# Patient Record
Sex: FEMALE | Race: WHITE | NOT HISPANIC OR LATINO | Employment: FULL TIME | URBAN - METROPOLITAN AREA
[De-identification: names, ages, dates, MRNs, and addresses within clinical notes are randomized per-mention and may not be internally consistent; named-entity substitution may affect disease eponyms.]

---

## 2023-06-17 LAB
CLUE CELLS: ABNORMAL
NUGENT SCORE: 4
VAGINITIS-BV + YEAST INTERPRETATION: ABNORMAL
YEAST: ABNORMAL

## 2023-09-12 PROBLEM — I10 BENIGN ESSENTIAL HYPERTENSION: Status: ACTIVE | Noted: 2023-09-12

## 2023-09-12 PROBLEM — N80.9 ENDOMETRIOSIS: Status: ACTIVE | Noted: 2023-09-12

## 2023-09-12 PROBLEM — S37.009A KIDNEY INJURY: Status: ACTIVE | Noted: 2023-09-12

## 2023-09-12 PROBLEM — F52.0 HYPOACTIVE SEXUAL DESIRE DISORDER: Status: ACTIVE | Noted: 2023-09-12

## 2023-09-12 PROBLEM — N95.2 ATROPHIC VAGINITIS: Status: ACTIVE | Noted: 2023-09-12

## 2023-09-12 PROBLEM — R42 VERTIGO: Status: ACTIVE | Noted: 2023-09-12

## 2023-09-12 PROBLEM — F54 PSYCHOLOGICAL FACTORS AFFECTING MEDICAL CONDITION: Status: ACTIVE | Noted: 2023-09-12

## 2023-09-12 PROBLEM — E11.9 TYPE 2 DIABETES MELLITUS (MULTI): Status: ACTIVE | Noted: 2023-09-12

## 2023-09-12 PROBLEM — N32.81 OVERACTIVE BLADDER: Status: ACTIVE | Noted: 2023-09-12

## 2023-09-12 PROBLEM — N76.1 DESQUAMATIVE INFLAMMATORY VAGINITIS: Status: ACTIVE | Noted: 2023-09-12

## 2023-09-12 PROBLEM — R92.8 ABNORMAL MAMMOGRAM: Status: ACTIVE | Noted: 2023-09-12

## 2023-09-12 PROBLEM — R05.3 CHRONIC COUGH: Status: ACTIVE | Noted: 2023-09-12

## 2023-09-12 PROBLEM — E78.00 HYPERCHOLESTEREMIA: Status: ACTIVE | Noted: 2023-09-12

## 2023-09-12 RX ORDER — ACETAMINOPHEN 325 MG/1
650 TABLET ORAL EVERY 6 HOURS PRN
COMMUNITY
Start: 2016-02-02

## 2023-09-12 RX ORDER — DAPAGLIFLOZIN 10 MG/1
1 TABLET, FILM COATED ORAL EVERY MORNING
COMMUNITY
Start: 2021-06-28

## 2023-09-12 RX ORDER — ALBUTEROL SULFATE 0.83 MG/ML
3 SOLUTION RESPIRATORY (INHALATION) EVERY 6 HOURS
COMMUNITY
Start: 2016-02-02 | End: 2023-10-16 | Stop reason: ALTCHOICE

## 2023-09-12 RX ORDER — FENOFIBRATE 145 MG/1
1 TABLET, FILM COATED ORAL DAILY
COMMUNITY
End: 2023-10-16 | Stop reason: ALTCHOICE

## 2023-09-12 RX ORDER — SEMAGLUTIDE 1.34 MG/ML
INJECTION, SOLUTION SUBCUTANEOUS
COMMUNITY
Start: 2021-12-02 | End: 2023-10-16 | Stop reason: ALTCHOICE

## 2023-09-12 RX ORDER — NAPROXEN 250 MG/1
1 TABLET ORAL EVERY 12 HOURS
COMMUNITY
Start: 2021-12-15 | End: 2024-03-29 | Stop reason: ALTCHOICE

## 2023-09-12 RX ORDER — GLIPIZIDE 10 MG/1
TABLET ORAL
COMMUNITY
Start: 2021-10-22 | End: 2024-03-29 | Stop reason: ALTCHOICE

## 2023-09-12 RX ORDER — ATORVASTATIN CALCIUM 40 MG/1
40 TABLET, FILM COATED ORAL NIGHTLY
COMMUNITY

## 2023-09-12 RX ORDER — OXYBUTYNIN CHLORIDE 10 MG/1
TABLET, EXTENDED RELEASE ORAL
COMMUNITY
Start: 2021-09-27

## 2023-09-12 RX ORDER — ESTRADIOL 0.1 MG/G
1 CREAM VAGINAL 3 TIMES WEEKLY
COMMUNITY
End: 2023-10-16 | Stop reason: SDUPTHER

## 2023-09-12 RX ORDER — SUMATRIPTAN 50 MG/1
50 TABLET, FILM COATED ORAL 2 TIMES DAILY
COMMUNITY
Start: 2021-11-08 | End: 2024-03-29 | Stop reason: ALTCHOICE

## 2023-09-12 RX ORDER — PREDNISONE 20 MG/1
20 TABLET ORAL EVERY 24 HOURS
COMMUNITY
Start: 2016-02-02

## 2023-09-12 RX ORDER — PROMETHAZINE HYDROCHLORIDE AND CODEINE PHOSPHATE 6.25; 1 MG/5ML; MG/5ML
5 SOLUTION ORAL 4 TIMES DAILY PRN
COMMUNITY
Start: 2016-02-02 | End: 2024-03-29 | Stop reason: ALTCHOICE

## 2023-09-12 RX ORDER — MECLIZINE HYDROCHLORIDE 25 MG/1
1 TABLET ORAL DAILY PRN
COMMUNITY
Start: 2021-07-06

## 2023-09-12 RX ORDER — GLIMEPIRIDE 2 MG/1
2 TABLET ORAL 2 TIMES DAILY
COMMUNITY
Start: 2016-02-02 | End: 2023-10-16 | Stop reason: ALTCHOICE

## 2023-09-12 RX ORDER — METOPROLOL SUCCINATE 100 MG/1
TABLET, EXTENDED RELEASE ORAL
COMMUNITY
Start: 2021-11-24

## 2023-09-12 RX ORDER — METFORMIN HYDROCHLORIDE 500 MG/1
TABLET, EXTENDED RELEASE ORAL
COMMUNITY
Start: 2015-09-22

## 2023-09-12 RX ORDER — VENLAFAXINE HYDROCHLORIDE 150 MG/1
1 CAPSULE, EXTENDED RELEASE ORAL DAILY
COMMUNITY
Start: 2021-10-11

## 2023-09-12 RX ORDER — VENLAFAXINE 75 MG/1
75 TABLET ORAL 2 TIMES DAILY
COMMUNITY
End: 2023-10-16 | Stop reason: ALTCHOICE

## 2023-09-12 RX ORDER — DOXYCYCLINE 100 MG/1
100 CAPSULE ORAL EVERY 12 HOURS
COMMUNITY
Start: 2016-02-02 | End: 2023-10-16 | Stop reason: ALTCHOICE

## 2023-09-12 RX ORDER — MEDROXYPROGESTERONE ACETATE 150 MG/ML
INJECTION, SUSPENSION INTRAMUSCULAR
COMMUNITY
Start: 2021-06-21 | End: 2023-10-16 | Stop reason: SDUPTHER

## 2023-09-12 RX ORDER — ONDANSETRON 4 MG/1
4 TABLET, ORALLY DISINTEGRATING ORAL 3 TIMES DAILY PRN
COMMUNITY
Start: 2020-07-26

## 2023-09-12 RX ORDER — ATORVASTATIN CALCIUM 20 MG/1
0.5 TABLET, FILM COATED ORAL DAILY
COMMUNITY
Start: 2021-12-02 | End: 2023-10-16 | Stop reason: ALTCHOICE

## 2023-10-16 ENCOUNTER — OFFICE VISIT (OUTPATIENT)
Dept: OBSTETRICS AND GYNECOLOGY | Facility: CLINIC | Age: 53
End: 2023-10-16
Payer: COMMERCIAL

## 2023-10-16 VITALS
HEIGHT: 61 IN | BODY MASS INDEX: 31.15 KG/M2 | WEIGHT: 165 LBS | SYSTOLIC BLOOD PRESSURE: 115 MMHG | DIASTOLIC BLOOD PRESSURE: 58 MMHG

## 2023-10-16 DIAGNOSIS — Z12.31 BREAST CANCER SCREENING BY MAMMOGRAM: ICD-10-CM

## 2023-10-16 DIAGNOSIS — Z01.419 WELL WOMAN EXAM WITH ROUTINE GYNECOLOGICAL EXAM: ICD-10-CM

## 2023-10-16 DIAGNOSIS — N95.2 VAGINAL ATROPHY: ICD-10-CM

## 2023-10-16 DIAGNOSIS — N80.9 ENDOMETRIOSIS: Primary | ICD-10-CM

## 2023-10-16 PROCEDURE — 88142 CYTOPATH C/V THIN LAYER: CPT

## 2023-10-16 PROCEDURE — 3074F SYST BP LT 130 MM HG: CPT | Performed by: OBSTETRICS & GYNECOLOGY

## 2023-10-16 PROCEDURE — 3078F DIAST BP <80 MM HG: CPT | Performed by: OBSTETRICS & GYNECOLOGY

## 2023-10-16 PROCEDURE — 87624 HPV HI-RISK TYP POOLED RSLT: CPT

## 2023-10-16 PROCEDURE — 99396 PREV VISIT EST AGE 40-64: CPT | Performed by: OBSTETRICS & GYNECOLOGY

## 2023-10-16 PROCEDURE — 96372 THER/PROPH/DIAG INJ SC/IM: CPT | Performed by: OBSTETRICS & GYNECOLOGY

## 2023-10-16 RX ORDER — ESTRADIOL 0.1 MG/G
CREAM VAGINAL
Qty: 42.5 G | Refills: 3 | Status: SHIPPED | OUTPATIENT
Start: 2023-10-16

## 2023-10-16 RX ORDER — MEDROXYPROGESTERONE ACETATE 150 MG/ML
150 INJECTION, SUSPENSION INTRAMUSCULAR ONCE
Status: COMPLETED | OUTPATIENT
Start: 2023-10-16 | End: 2023-10-16

## 2023-10-16 RX ORDER — MEDROXYPROGESTERONE ACETATE 150 MG/ML
150 INJECTION, SUSPENSION INTRAMUSCULAR
Qty: 1 ML | Refills: 3 | Status: SHIPPED | OUTPATIENT
Start: 2023-10-16

## 2023-10-16 RX ORDER — SEMAGLUTIDE 2.68 MG/ML
INJECTION, SOLUTION SUBCUTANEOUS
COMMUNITY
Start: 2023-08-10

## 2023-10-16 RX ORDER — LEVETIRACETAM 250 MG/1
250 TABLET ORAL
COMMUNITY
End: 2024-05-06 | Stop reason: WASHOUT

## 2023-10-16 RX ADMIN — MEDROXYPROGESTERONE ACETATE 150 MG: 150 INJECTION, SUSPENSION INTRAMUSCULAR at 17:54

## 2023-10-16 NOTE — PROGRESS NOTES
"Patient presents for preventative care.  Has been using vaginal estrogen and it has \"helped a little\".  Penetrative intercourse is still uncomfortable.  Has not used vaginal dilators significantly.    Discussed ongoing use of Depo-Provera and the patient is quite reluctant to stop at this point due to history of endometriosis and significant menorrhagia and dysmenorrhea.  Patient wishes to continue at that point.  She is well aware that she needs to stop at some point.    REVIEW OF SYSTEMS    Please see HPI for reported pertinent positives, which would supersede this ROS    Constitutional:  Denies fever, chills, wt gain or loss, fatigue    Genito-Urinary:  Denies genital lesion or sores, vaginal dryness, itching  or pain.  No abnormal vaginal discharge or unexplained vaginal bleeding.  No dysuria, urinary incontinence or frequency.  Denies pelvic pain, dysmenorrhea or dyspareunia.    Eyes:  Denies vision changes, dryness  ENT:  No hearing loss, sinus pain or congestion, nosebleeds  Cardiovascular:  No chest pain or palpitations  Respiratory:  No SOB, cough, wheezing  GI:  No Nausea, vomiting, diarrhea, constipation, abdominal pain  Musculoskeletal:  No new back pain. joint pain, peripheral edema  Skin:  No rash or skin lesion  Neurologic:  No HA, numbness or dizziness  Psychiatric:  No new anxiety or depression  Endocrine:  No loss of hair or hirsutism  Hematologic/lymphatic:  No swollen lymph nodes.  No reported tendency for easy bruising or bleeding    Patient admits to no other systemic complaints      Vitals:    10/16/23 1659   BP: 115/58       PHYSICAL EXAM:    PSYCH:  Pt is alert, oriented and cooperative    SKIN: warm, dry, w/o lesion    HEAD AND FACE:  External inspection of eyes, ears, functional cranial nerves normal and intact    THYROID:  No thyromegaly    CARDIOVASCULAR:  Warm and well Perfused    PULMONARY:  No respiratory distress    ABDOMEN:  soft, nontender.  No mass or organomegaly.      BREAST:  " Breasts are symmetric to inspection and palpation.  No mass palpable bilaterally.  There is no axillary lymphadenopathy    PELVIC:    External genitalia, urethra, perianal region normal to inspection and palpation if indicated.  No inguinal LA    Vagina without lesions.  Tissue estrogenized  Cervix seen and visually normal      Bimanual exam:      No pelvic mass palpable    Uterus nontender, midline in pelvis    No adnexal masses or tenderness    Assessment/Plan    Diagnoses and all orders for this visit:  Endometriosis  -     medroxyPROGESTERone 150 mg/mL injection; Inject 1 mL (150 mg) into the muscle every 3 months.  INJECT EVERY 10 WEEKS, BRING TO OFFICE  -     medroxyPROGESTERone (Depo-Provera) injection 150 mg  Well woman exam with routine gynecological exam  -     THINPREP PAP TEST  Breast cancer screening by mammogram  -     BI mammo bilateral screening tomosynthesis; Future  Vaginal atrophy  -     estradiol (Estrace) 0.01 % (0.1 mg/gram) vaginal cream; 1 g in applicator nightly.  Disp # 3,  42.5 gm tubes (90Days)  Refill #

## 2023-10-27 LAB
CYTOLOGY CMNT CVX/VAG CYTO-IMP: NORMAL
HPV HR GENOTYPES PNL CVX NAA+PROBE: NEGATIVE
HPV HR GENOTYPES PNL CVX NAA+PROBE: NEGATIVE
HPV16 DNA SPEC QL NAA+PROBE: NEGATIVE
HPV18 DNA SPEC QL NAA+PROBE: NEGATIVE
LAB AP CONTRACEPTIVE HISTORY: NORMAL
LAB AP HPV GENOTYPE QUESTION: YES
LAB AP HPV HR: NORMAL
LABORATORY COMMENT REPORT: NORMAL
LABORATORY COMMENT REPORT: NORMAL
PATH REPORT.TOTAL CANCER: NORMAL

## 2023-12-06 ENCOUNTER — APPOINTMENT (OUTPATIENT)
Dept: RADIOLOGY | Facility: CLINIC | Age: 53
End: 2023-12-06
Payer: COMMERCIAL

## 2023-12-18 ENCOUNTER — PROCEDURE VISIT (OUTPATIENT)
Dept: OBSTETRICS AND GYNECOLOGY | Facility: CLINIC | Age: 53
End: 2023-12-18
Payer: COMMERCIAL

## 2023-12-18 DIAGNOSIS — Z30.42 DEPO-PROVERA CONTRACEPTIVE STATUS: Primary | ICD-10-CM

## 2023-12-18 PROCEDURE — 96372 THER/PROPH/DIAG INJ SC/IM: CPT | Performed by: OBSTETRICS & GYNECOLOGY

## 2023-12-18 RX ORDER — MEDROXYPROGESTERONE ACETATE 150 MG/ML
150 INJECTION, SUSPENSION INTRAMUSCULAR ONCE
Status: COMPLETED | OUTPATIENT
Start: 2023-12-18 | End: 2023-12-18

## 2023-12-18 RX ADMIN — MEDROXYPROGESTERONE ACETATE 150 MG: 150 INJECTION, SUSPENSION INTRAMUSCULAR at 16:39

## 2023-12-18 NOTE — PROGRESS NOTES
Patient ID: Jody Joyce is a 53 y.o. female.    Procedures    Medroxyprogesterone Acetate 150mg/ml    Given 12/18/23  IM Right Deltoid  Lot# [id number]  EXP: 9/30/27  NDC:[id number]

## 2023-12-26 ENCOUNTER — ANCILLARY PROCEDURE (OUTPATIENT)
Dept: RADIOLOGY | Facility: CLINIC | Age: 53
End: 2023-12-26
Payer: COMMERCIAL

## 2023-12-26 DIAGNOSIS — Z12.31 BREAST CANCER SCREENING BY MAMMOGRAM: ICD-10-CM

## 2023-12-26 PROCEDURE — 77067 SCR MAMMO BI INCL CAD: CPT | Performed by: RADIOLOGY

## 2023-12-26 PROCEDURE — 77063 BREAST TOMOSYNTHESIS BI: CPT | Performed by: RADIOLOGY

## 2023-12-26 PROCEDURE — 77067 SCR MAMMO BI INCL CAD: CPT

## 2023-12-28 ENCOUNTER — HOSPITAL ENCOUNTER (OUTPATIENT)
Dept: RADIOLOGY | Facility: EXTERNAL LOCATION | Age: 53
Discharge: HOME | End: 2023-12-28

## 2023-12-28 ENCOUNTER — LAB (OUTPATIENT)
Dept: LAB | Facility: LAB | Age: 53
End: 2023-12-28
Payer: COMMERCIAL

## 2023-12-28 DIAGNOSIS — A37.90 WHOOPING COUGH: ICD-10-CM

## 2023-12-28 DIAGNOSIS — A37.90 WHOOPING COUGH: Primary | ICD-10-CM

## 2023-12-28 PROCEDURE — 36415 COLL VENOUS BLD VENIPUNCTURE: CPT

## 2024-02-26 ENCOUNTER — PROCEDURE VISIT (OUTPATIENT)
Dept: OBSTETRICS AND GYNECOLOGY | Facility: CLINIC | Age: 54
End: 2024-02-26
Payer: COMMERCIAL

## 2024-02-26 DIAGNOSIS — Z30.42 DEPO-PROVERA CONTRACEPTIVE STATUS: Primary | ICD-10-CM

## 2024-02-26 PROCEDURE — 96372 THER/PROPH/DIAG INJ SC/IM: CPT | Performed by: OBSTETRICS & GYNECOLOGY

## 2024-02-26 RX ORDER — MEDROXYPROGESTERONE ACETATE 150 MG/ML
150 INJECTION, SUSPENSION INTRAMUSCULAR ONCE
Status: COMPLETED | OUTPATIENT
Start: 2024-02-26 | End: 2024-02-26

## 2024-02-26 RX ADMIN — MEDROXYPROGESTERONE ACETATE 150 MG: 150 INJECTION, SUSPENSION INTRAMUSCULAR at 16:30

## 2024-02-26 NOTE — PROGRESS NOTES
"Patient ID: Jody Joyce \"Pita\" is a 53 y.o. female.    Medroxyprogesterone Acetate 150mg/ml    Given 2/26/24  IM Right Deltoid  Lot# [id number]  EXP: 9/30/2026  NDC: 2781481071  "

## 2024-03-29 ENCOUNTER — OFFICE VISIT (OUTPATIENT)
Dept: GASTROENTEROLOGY | Facility: CLINIC | Age: 54
End: 2024-03-29
Payer: COMMERCIAL

## 2024-03-29 VITALS — HEART RATE: 77 BPM | BODY MASS INDEX: 31.72 KG/M2 | OXYGEN SATURATION: 99 % | WEIGHT: 168 LBS | HEIGHT: 61 IN

## 2024-03-29 DIAGNOSIS — K21.9 GASTROESOPHAGEAL REFLUX DISEASE, UNSPECIFIED WHETHER ESOPHAGITIS PRESENT: ICD-10-CM

## 2024-03-29 DIAGNOSIS — K59.1 FUNCTIONAL DIARRHEA: Primary | ICD-10-CM

## 2024-03-29 DIAGNOSIS — R11.2 NAUSEA AND VOMITING, UNSPECIFIED VOMITING TYPE: ICD-10-CM

## 2024-03-29 DIAGNOSIS — Z12.11 ENCOUNTER FOR SCREENING FOR MALIGNANT NEOPLASM OF COLON: ICD-10-CM

## 2024-03-29 PROCEDURE — 99204 OFFICE O/P NEW MOD 45 MIN: CPT | Performed by: STUDENT IN AN ORGANIZED HEALTH CARE EDUCATION/TRAINING PROGRAM

## 2024-03-29 PROCEDURE — 1036F TOBACCO NON-USER: CPT | Performed by: STUDENT IN AN ORGANIZED HEALTH CARE EDUCATION/TRAINING PROGRAM

## 2024-03-29 RX ORDER — FAMOTIDINE 40 MG/1
40 TABLET, FILM COATED ORAL 2 TIMES DAILY
Qty: 60 TABLET | Refills: 11 | Status: SHIPPED | OUTPATIENT
Start: 2024-03-29 | End: 2025-03-29

## 2024-03-29 ASSESSMENT — ENCOUNTER SYMPTOMS
TROUBLE SWALLOWING: 0
ABDOMINAL PAIN: 1
BLOOD IN STOOL: 0
FEVER: 0
CONSTIPATION: 0
DIARRHEA: 1
ABDOMINAL DISTENTION: 0
VOMITING: 1
RECTAL PAIN: 0
ANAL BLEEDING: 0
UNEXPECTED WEIGHT CHANGE: 0
COLOR CHANGE: 0
SHORTNESS OF BREATH: 0
CHILLS: 0
NAUSEA: 1

## 2024-03-29 NOTE — ASSESSMENT & PLAN NOTE
Chronic intermittent diarrhea now more frequent. Infectious stool panel neg. Currently being tx for UTI. Unclear etiology. Suspect DM with altered GI motility largely contributing along with very low fiber intake. Could have some medication cause but less suspicious for metformin given chronic use. COVID and post infectious also a factor and current UTI  - start with fiber supplementation, will limit high fiber foods given concern for gastroparesis  - limit artifical sweeteners such as crystal light which can cause diarrhea   - start fiber supplement: Metamucil, Benefiber or Citrucel generic; take 1 tbsp/1 gummies/1 capsules daily for one week with adequate water, if no improvement in bowel consistency after one week increase to two and so on. Must take daily   - increase water intake  - check fecal elastase   - if no improvement w above consider trial of questran  - can also consider flex sig with bx for microscopic colitis., not on obvious offending medications

## 2024-03-29 NOTE — ASSESSMENT & PLAN NOTE
Chest burning and regurgitation sxs, two - three times a week   - trial of Pepcid every day, to BID   - weight loss  - if worsening GERD despite H2 blocker can trial PPI

## 2024-03-29 NOTE — ASSESSMENT & PLAN NOTE
Chronic intermittent sxs now acutely worsening, hx of bezoar. DM not adequately controlled. Hx of GES many years ago after bezoar. Strong suspicion for underlying gastroparesis given hx of bezoar, acute UTI can be exacerbating chronic sxs. Ozempic could also be contributing    - suggest trial off Ozempic to see if improvement in sxs given AE of delayed gastric emptying    - no plan for repeat EGD  - if patient stops Ozempic for ~ 1 mo plan for rpt GES to confirm gastroparesis. Discussed that we do not know how long the effect ozempic has on gastric emptying so results could be inaccurate   - gastroparesis diet discussed w handout provided  - Don't recommend stopping both metformin and ozempic simultaneously. Recommend one medication adjustment at a time to better find cause/effect

## 2024-03-29 NOTE — PROGRESS NOTES
Chief Complaint:  Chief Complaint   Patient presents with    Abdominal Pain    Gastroparesis       Here with significant other. Hx of Bezoar and diverticulitis years ago    Recent ED visit 2 days prior for N/V/D, diagnosed and treated with a UTI, on Macrobid.  Enteric PCR and C.diff neg    PMD and endocrinologist want patient to see GI due to diarrhea. Had colonoscopy done 2023 and surgeon who did scope also recommended GI f/up.    Current complaint is of worsening diarrhea.   Had intermittent diarrhea in past but only related to food.  Diarrhea worsened 10 days ago acutely and in October. Dx with COVID in Nov. Possibly long haul     BM daily  Normal [place] 3/4  Diarrhea [place] 7, 4-5 BM during day, sometimes nocturnal BM  No hematochezia     Used to have nausea and vomiting three times a year  But now nausea,vomiting are more frequent  Last week vomited in the middle of the meal while at work, had to leave work and continued to have vomiting.     Also bothersome GERD described as chest burning and regurgitation, 2-3 times a week, can wake at night with symptoms.     No new medications  On metformin chronically for years   Ozempic since 2021    Last HBA1C 8.1   Pharmacy has suggestions regarding stopping metformin and ozempic given sxs.    Colon 12/2023 w 3mm polyp, told to repeat 10 yrs  Multiple EGDs in past     Fruit: 0;  2 times a month  Veggies: daily to every other day  Whole Grain: 0  Water: 8 oz  Drinks crystal light    Abdominal Pain  Associated Symptoms:    diarrhea, nausea and vomiting    no anal bleeding, no constipation, no fever, no rectal pain and no trouble swallowing          Review of Systems   Constitutional:  Negative for chills, fever and unexpected weight change.   HENT:  Negative for trouble swallowing.    Respiratory:  Negative for shortness of breath.    Cardiovascular:  Negative for chest pain.   Gastrointestinal:  Positive for abdominal pain, diarrhea, nausea and vomiting. Negative for  abdominal distention, anal bleeding, blood in stool, constipation and rectal pain.   Skin:  Negative for color change.     Family History   Family history unknown: Yes       Medications    Current Outpatient Medications:     acetaminophen (Tylenol) 325 mg tablet, Take 2 tablets (650 mg) by mouth every 6 hours if needed for mild pain (1 - 3)., Disp: , Rfl:     atorvastatin (Lipitor) 40 mg tablet, Take 1 tablet (40 mg) by mouth once daily at bedtime., Disp: , Rfl:     blood sugar diagnostic strip, CHECK BLOOD SUGARS 4 TIMES DAILY,  ICD-10: E11.9, Disp: , Rfl:     dapagliflozin propanediol (Farxiga) 10 mg, Take 1 tablet (10 mg) by mouth once daily in the morning., Disp: , Rfl:     estradiol (Estrace) 0.01 % (0.1 mg/gram) vaginal cream, 1 g in applicator nightly. Disp # 3,  42.5 gm tubes (90Days) Refill #, Disp: 42.5 g, Rfl: 3    levETIRAcetam (Keppra) 250 mg tablet, Take 1 tablet (250 mg) by mouth 5 times a day. 2 in the morning 3 at night, Disp: , Rfl:     meclizine (Antivert) 25 mg tablet, Take 1 tablet (25 mg) by mouth once daily as needed., Disp: , Rfl:     medroxyPROGESTERone 150 mg/mL injection, Inject 1 mL (150 mg) into the muscle every 3 months.  INJECT EVERY 10 WEEKS, BRING TO OFFICE, Disp: 1 mL, Rfl: 3    metFORMIN  mg 24 hr tablet, metFORMIN HCl  MG Oral Tablet Extended Release 24 Hour  Quantity: 30  Refills: 0      Start : 22-Sep-2015  Active, Disp: , Rfl:     metoprolol succinate XL (Toprol-XL) 100 mg 24 hr tablet, Metoprolol Succinate  MG Oral Tablet Extended Release 24 Hour  Quantity: 90  Refills: 0      Start : 24-Nov-2021  Active, Disp: , Rfl:     ondansetron ODT (Zofran-ODT) 4 mg disintegrating tablet, Take 1 tablet (4 mg) by mouth 3 times a day as needed., Disp: , Rfl:     oxybutynin XL (Ditropan-XL) 10 mg 24 hr tablet, Oxybutynin Chloride ER 10 MG Oral Tablet Extended Release 24 Hour  Quantity: 90  Refills: 0      Start : 27-Sep-2021  Active, Disp: , Rfl:     Ozempic 2 mg/dose (8  "mg/3 mL) pen injector, , Disp: , Rfl:     predniSONE (Deltasone) 20 mg tablet, Take 1 tablet (20 mg) by mouth once every 24 hours., Disp: , Rfl:     venlafaxine XR (Effexor-XR) 150 mg 24 hr capsule, Take 1 capsule (150 mg) by mouth once daily., Disp: , Rfl:     famotidine (Pepcid) 40 mg tablet, Take 1 tablet (40 mg) by mouth 2 times a day., Disp: 60 tablet, Rfl: 11    Vitals  Pulse 77   Ht 1.549 m (5' 1\")   Wt 76.2 kg (168 lb)   SpO2 99%   BMI 31.74 kg/m²     Physical Exam  Vitals reviewed.   Constitutional:       General: She is awake.      Appearance: Normal appearance.   HENT:      Head: Normocephalic and atraumatic.      Nose: Nose normal.      Mouth/Throat:      Mouth: Mucous membranes are moist.   Eyes:      Pupils: Pupils are equal, round, and reactive to light.   Cardiovascular:      Rate and Rhythm: Normal rate.   Pulmonary:      Effort: Pulmonary effort is normal.   Abdominal:      General: Abdomen is flat. Bowel sounds are normal. There is no distension.      Palpations: Abdomen is soft. There is no mass.      Tenderness: There is no abdominal tenderness. There is no guarding or rebound.   Neurological:      Mental Status: She is alert and oriented to person, place, and time. Mental status is at baseline.   Psychiatric:         Attention and Perception: Attention and perception normal.         Mood and Affect: Mood normal.         Behavior: Behavior normal.           Labs:  No results found for: \"AFP\"No results found for: \"ASMAB\", \"MITOAB\"No results found for: \"MOUNA\"No results found for: \"ASMAB\", \"MITOAB\"No results found for: \"IMIWZUCL65\"No results found for: \"HEPCAB\"No results found for: \"HEPATOT\", \"HEPAIGM\", \"HEPBCIGM\", \"HEPBCAB\", \"HBEAG\", \"HEPCAB\"No results found for: \"HIV1X2\"No results found for: \"IRON\", \"TIBC\", \"FERRITIN\"No results found for: \"INR\", \"PROTIME\"No results found for: \"TSH\"    Radiology  BI mammo bilateral screening tomosynthesis  Narrative: Interpreted By:  Sd Hernandez,   STUDY:  BI " MAMMO BILATERAL SCREENING TOMOSYNTHESIS; 12/26/2023 8:16 am      ACCESSION NUMBER(S):  [id number]      ORDERING CLINICIAN:  VERONICA LORENZO      INDICATION:  Screening.      COMPARISON:  08/06/2020, 09/27/2021, 11/23/2022      FINDINGS:  2D and tomosynthesis images were reviewed at 1 mm slice thickness.      Density:  The breast tissue is heterogeneously dense, which may  obscure small masses.      A biopsy clip is noted along the central lateral position of the  right breast as well as one  along the upper central position of the  left breast.. No new suspicious masses or calcifications are  identified.      Impression: No mammographic evidence of malignancy.      BI-RADS CATEGORY:  BI-RADS Category:  2 Benign.  Recommendation:  Routine Screening Mammogram in 1 Year.  Recommended Date:  1 Year.  Laterality:  Bilateral.      For any future breast imaging appointments, please call [phone]  ([phone]).          MACRO:  None      Signed by: Sd Hernandez 12/28/2023 3:13 PM  Dictation workstation:   [id number]  BI transfer of outside films  Outside images for comparison or treatment purposes, not interpreted by    Radiologists.  BI transfer of outside films  Outside images for comparison or treatment purposes, not interpreted by    Radiologists.  BI transfer of outside films  Outside images for comparison or treatment purposes, not interpreted by    Radiologists.  BI transfer of outside films  Outside images for comparison or treatment purposes, not interpreted by    Radiologists.  BI transfer of outside films  Outside images for comparison or treatment purposes, not interpreted by    Radiologists.  BI transfer of outside films  Outside images for comparison or treatment purposes, not interpreted by    Radiologists.  BI transfer of outside films  Outside images for comparison or treatment purposes, not interpreted by    Radiologists.  BI transfer of outside films  Outside images for comparison or  treatment purposes, not interpreted by    Radiologists.  BI transfer of outside films  Outside images for comparison or treatment purposes, not interpreted by    Radiologists.      A/P   Jody was seen today for abdominal pain and gastroparesis.  Diagnoses and all orders for this visit:  Functional diarrhea (Primary)  -     Pancreatic Elastase, Fecal; Future  -     famotidine (Pepcid) 40 mg tablet; Take 1 tablet (40 mg) by mouth 2 times a day.  -     NM gastric emptying solid; Future  -     Follow Up In Gastroenterology; Future  Nausea and vomiting, unspecified vomiting type  -     Pancreatic Elastase, Fecal; Future  -     famotidine (Pepcid) 40 mg tablet; Take 1 tablet (40 mg) by mouth 2 times a day.  -     NM gastric emptying solid; Future  -     Follow Up In Gastroenterology; Future  Gastroesophageal reflux disease, unspecified whether esophagitis present  Encounter for screening for malignant neoplasm of colon     Problem List Items Addressed This Visit             ICD-10-CM    Functional diarrhea - Primary K59.1     Chronic intermittent diarrhea now more frequent. Infectious stool panel neg. Currently being tx for UTI. Unclear etiology. Suspect DM with altered GI motility largely contributing along with very low fiber intake. Could have some medication cause but less suspicious for metformin given chronic use. COVID and post infectious also a factor and current UTI  - start with fiber supplementation, will limit high fiber foods given concern for gastroparesis  - limit artifical sweeteners such as crystal light which can cause diarrhea   - start fiber supplement: Metamucil, Benefiber or Citrucel generic; take 1 tbsp/1 gummies/1 capsules daily for one week with adequate water, if no improvement in bowel consistency after one week increase to two and so on. Must take daily   - increase water intake  - check fecal elastase   - if no improvement w above consider trial of questran  - can also consider flex sig  with bx for microscopic colitis., not on obvious offending medications             Relevant Medications    famotidine (Pepcid) 40 mg tablet    Other Relevant Orders    Pancreatic Elastase, Fecal    NM gastric emptying solid    Follow Up In Gastroenterology    Nausea and vomiting R11.2     Chronic intermittent sxs now acutely worsening, hx of bezoar. DM not adequately controlled. Hx of GES many years ago after bezoar. Strong suspicion for underlying gastroparesis given hx of bezoar, acute UTI can be exacerbating chronic sxs. Ozempic could also be contributing    - suggest trial off Ozempic to see if improvement in sxs given AE of delayed gastric emptying    - no plan for repeat EGD  - if patient stops Ozempic for ~ 1 mo plan for rpt GES to confirm gastroparesis. Discussed that we do not know how long the effect ozempic has on gastric emptying so results could be inaccurate   - gastroparesis diet discussed w handout provided  - Don't recommend stopping both metformin and ozempic simultaneously. Recommend one medication adjustment at a time to better find cause/effect           Relevant Medications    famotidine (Pepcid) 40 mg tablet    Other Relevant Orders    Pancreatic Elastase, Fecal    NM gastric emptying solid    Follow Up In Gastroenterology    GERD (gastroesophageal reflux disease) K21.9     Chest burning and regurgitation sxs, two - three times a week   - trial of Pepcid every day, to BID   - weight loss  - if worsening GERD despite H2 blocker can trial PPI         Encounter for screening for malignant neoplasm of colon Z12.11     Colon 12/2023 w one hyperplastic rectal polyp per report, no path to review  - rpt 7-10yrs

## 2024-03-29 NOTE — PATIENT INSTRUCTIONS
For Fiber  Start a fiber supplement daily. Suggest Benefiber or Cirtrucel. Start low and increase dose slow. Start with 1 tsp/ 1 capsule/ 1 gummy of fiber each day. Must drink atleast 8 cups of water throughout the day. Can increase the dose of fiber after 1 week of daily use. It is important to take this DAILY.     Fiber is what helps keep stool moving throughout the colon and keeps stool from being too hard or too loose!     Trial of Pepcid 40mg daily to twice a day for heartburn. Can take before bed if having night time symptoms    If you can be off Ozempic for ~ 1 mo then obtain a Gastric Emptying Study    Review the gastroparesis diet and try and modifications

## 2024-05-02 ENCOUNTER — HOSPITAL ENCOUNTER (OUTPATIENT)
Dept: RADIOLOGY | Facility: HOSPITAL | Age: 54
Discharge: HOME | End: 2024-05-02
Payer: COMMERCIAL

## 2024-05-02 DIAGNOSIS — K59.1 FUNCTIONAL DIARRHEA: ICD-10-CM

## 2024-05-02 DIAGNOSIS — R11.2 NAUSEA AND VOMITING, UNSPECIFIED VOMITING TYPE: ICD-10-CM

## 2024-05-02 PROCEDURE — 78264 GASTRIC EMPTYING IMG STUDY: CPT

## 2024-05-02 PROCEDURE — 3430000001 HC RX 343 DIAGNOSTIC RADIOPHARMACEUTICALS: Performed by: STUDENT IN AN ORGANIZED HEALTH CARE EDUCATION/TRAINING PROGRAM

## 2024-05-02 PROCEDURE — 78264 GASTRIC EMPTYING IMG STUDY: CPT | Performed by: NUCLEAR MEDICINE

## 2024-05-02 RX ADMIN — Medication 1 MILLICURIE: at 07:47

## 2024-05-06 ENCOUNTER — OFFICE VISIT (OUTPATIENT)
Dept: OBSTETRICS AND GYNECOLOGY | Facility: CLINIC | Age: 54
End: 2024-05-06
Payer: COMMERCIAL

## 2024-05-06 VITALS — WEIGHT: 172 LBS | BODY MASS INDEX: 32.5 KG/M2

## 2024-05-06 DIAGNOSIS — R21 RASH IN ADULT: ICD-10-CM

## 2024-05-06 DIAGNOSIS — N76.0 ACUTE VAGINITIS: Primary | ICD-10-CM

## 2024-05-06 DIAGNOSIS — Z30.42 DEPO-PROVERA CONTRACEPTIVE STATUS: ICD-10-CM

## 2024-05-06 PROCEDURE — 99214 OFFICE O/P EST MOD 30 MIN: CPT | Performed by: OBSTETRICS & GYNECOLOGY

## 2024-05-06 PROCEDURE — 87205 SMEAR GRAM STAIN: CPT

## 2024-05-06 PROCEDURE — 96372 THER/PROPH/DIAG INJ SC/IM: CPT | Performed by: OBSTETRICS & GYNECOLOGY

## 2024-05-06 RX ORDER — ALBUTEROL SULFATE 0.83 MG/ML
SOLUTION RESPIRATORY (INHALATION)
COMMUNITY
Start: 2023-11-13

## 2024-05-06 RX ORDER — MEDROXYPROGESTERONE ACETATE 150 MG/ML
150 INJECTION, SUSPENSION INTRAMUSCULAR ONCE
Status: COMPLETED | OUTPATIENT
Start: 2024-05-06 | End: 2024-05-06

## 2024-05-06 RX ORDER — PEN NEEDLE, DIABETIC 31 GX5/16"
NEEDLE, DISPOSABLE MISCELLANEOUS
COMMUNITY
Start: 2024-04-25

## 2024-05-06 RX ORDER — CLOTRIMAZOLE AND BETAMETHASONE DIPROPIONATE 10; .64 MG/G; MG/G
1 CREAM TOPICAL 2 TIMES DAILY
Qty: 30 G | Refills: 0 | Status: SHIPPED | OUTPATIENT
Start: 2024-05-06 | End: 2024-07-05

## 2024-05-06 RX ORDER — INSULIN GLARGINE 100 [IU]/ML
INJECTION, SOLUTION SUBCUTANEOUS
COMMUNITY
Start: 2024-04-22

## 2024-05-06 RX ORDER — LEVETIRACETAM 750 MG/1
TABLET ORAL
COMMUNITY
Start: 2024-03-17

## 2024-05-06 RX ORDER — INSULIN ASPART 100 [IU]/ML
INJECTION, SOLUTION INTRAVENOUS; SUBCUTANEOUS
COMMUNITY

## 2024-05-06 RX ORDER — OMEPRAZOLE 40 MG/1
40 CAPSULE, DELAYED RELEASE ORAL
COMMUNITY
Start: 2024-03-27

## 2024-05-06 RX ORDER — ALBUTEROL SULFATE 90 UG/1
AEROSOL, METERED RESPIRATORY (INHALATION)
COMMUNITY
Start: 2023-11-13

## 2024-05-06 RX ADMIN — MEDROXYPROGESTERONE ACETATE 150 MG: 150 INJECTION, SUSPENSION INTRAMUSCULAR at 13:25

## 2024-05-06 NOTE — PROGRESS NOTES
"Patient ID: Jody Joyce \"Edilia" is a 54 y.o. female.    Medroxyprogesterone Acetate 150mg/ml    Given 5/6/24  IM Right Deltoid  Lot# [id number]  EXP: 4/30/27  NDC: [id number]    Chief Complaint   Patient presents with    Vaginitis/Bacterial Vaginosis     Vaginal Irritation    G0    C/O vaginal redness/irritation at times.  Breast rash x2 weeks that burns at times.  Patient states she has used different creams and powders without success.    Chaperone declined.     See prior notes.  Has been using vaginal estrogen cream once per week.  Has been able to achieve penetrative intercourse but still uncomfortable.  Encouraged her to increase dosing to 3 times per week.  Patient's  notes she \"swollen\".  Patient is currently having no vaginal or vulvar burning.  Does note rash between and under breasts.  Diabetes has been under poor control recently.    REVIEW OF SYSTEMS    Please see HPI for reported pertinent positives, which would supersede this ROS    Constitutional:  Denies fever, chills, wt gain or loss, fatigue    Genito-Urinary:  Denies genital lesion or sores, vaginal dryness, itching  or pain.  No abnormal vaginal discharge or unexplained vaginal bleeding.  No dysuria, urinary incontinence or frequency.  Denies pelvic pain, dysmenorrhea or dyspareunia.    Eyes:  Denies vision changes, dryness  ENT:  No hearing loss, sinus pain or congestion, nosebleeds  Cardiovascular:  No chest pain or palpitations  Respiratory:  No SOB, cough, wheezing  GI:  No Nausea, vomiting, diarrhea, constipation, abdominal pain  Musculoskeletal:  No new back pain. joint pain, peripheral edema  Skin:  No rash or skin lesion  Neurologic:  No HA, numbness or dizziness  Psychiatric:  No new anxiety or depression  Endocrine:  No loss of hair or hirsutism  Hematologic/lymphatic:  No swollen lymph nodes.  No reported tendency for easy bruising or bleeding    Patient admits to no other systemic complaints      PHYSICAL EXAM      PSYCH:  Pt is " alert, oriented and cooperative    SKIN: warm, dry, w/o lesion    HEAD AND FACE:  External inspection of eyes, ears, functional cranial nerves normal and intact    THYROID:  No thyromegaly    CARDIOVASCULAR:  Warm and well Perfused    PULMONARY:  No respiratory distress    ABDOMEN:  soft, nontender.  No mass or organomegaly.      Between breasts, rash c/w yeast dermatitis    PELVIC:    External genitalia, urethra, perianal region normal to inspection and palpation if indicated.  No inguinal LA  Small healing fissure posteriorly  No lsa  Vagina without lesions.    Cervix seen and visually normal  Vaginal Gram stain sent.    Assessment/Plan    Diagnoses and all orders for this visit:  Acute vaginitis  -     Vaginitis Gram Stain For Bacterial Vaginosis + Yeast  -     clotrimazole-betamethasone (Lotrisone) cream; Apply 1 Application topically 2 times a day.  Rash in adult  Depo-Provera contraceptive status  -     medroxyPROGESTERone (Depo-Provera) injection 150 mg

## 2024-05-07 LAB
BACTERIAL VAGINOSIS VAG-IMP: ABNORMAL
CLUE CELLS VAG LPF-#/AREA: ABNORMAL /[LPF]
NUGENT SCORE: 4
YEAST VAG WET PREP-#/AREA: PRESENT

## 2024-06-28 DIAGNOSIS — K59.1 FUNCTIONAL DIARRHEA: ICD-10-CM

## 2024-06-28 DIAGNOSIS — R11.2 NAUSEA AND VOMITING, UNSPECIFIED VOMITING TYPE: ICD-10-CM

## 2024-06-28 RX ORDER — FAMOTIDINE 40 MG/1
40 TABLET, FILM COATED ORAL 2 TIMES DAILY
Qty: 180 TABLET | Refills: 1 | Status: SHIPPED | OUTPATIENT
Start: 2024-06-28 | End: 2025-06-28

## 2024-07-15 ENCOUNTER — APPOINTMENT (OUTPATIENT)
Dept: OBSTETRICS AND GYNECOLOGY | Facility: CLINIC | Age: 54
End: 2024-07-15
Payer: COMMERCIAL

## 2024-07-15 DIAGNOSIS — Z30.42 DEPO-PROVERA CONTRACEPTIVE STATUS: Primary | ICD-10-CM

## 2024-07-15 PROCEDURE — 96372 THER/PROPH/DIAG INJ SC/IM: CPT | Performed by: OBSTETRICS & GYNECOLOGY

## 2024-07-15 RX ORDER — MEDROXYPROGESTERONE ACETATE 150 MG/ML
150 INJECTION, SUSPENSION INTRAMUSCULAR ONCE
Status: COMPLETED | OUTPATIENT
Start: 2024-07-15 | End: 2024-07-15

## 2024-07-15 NOTE — PROGRESS NOTES
"Patient ID: Jody Joyce \"Pita\" is a 54 y.o. female.    Medroxyprogesterone Acetate 150mg/ml    Given 7/15/24  IM Left Deltoid  Lot# [id number]  EXP: 1/31/28  NDC: [id number]    "

## 2024-07-31 ENCOUNTER — OFFICE VISIT (OUTPATIENT)
Dept: PAIN MEDICINE | Facility: HOSPITAL | Age: 54
End: 2024-07-31
Payer: COMMERCIAL

## 2024-07-31 DIAGNOSIS — M70.62 TROCHANTERIC BURSITIS, LEFT HIP: Primary | ICD-10-CM

## 2024-07-31 DIAGNOSIS — M76.32 ILIOTIBIAL BAND SYNDROME OF LEFT SIDE: ICD-10-CM

## 2024-07-31 PROCEDURE — 99213 OFFICE O/P EST LOW 20 MIN: CPT | Performed by: PAIN MEDICINE

## 2024-07-31 PROCEDURE — 99203 OFFICE O/P NEW LOW 30 MIN: CPT | Performed by: PAIN MEDICINE

## 2024-07-31 ASSESSMENT — PAIN SCALES - GENERAL: PAINLEVEL: 7

## 2024-07-31 NOTE — PROGRESS NOTES
"Subjective   Patient ID: Jody Joyce is a 54 y.o. female with hx of DM2, chronic left hip pain s/p hip surgery x2, now presenting with persistent left hip pain. Patient states that she had been diagnosed with osteoarthritis in the left hip and been receiving steroid injections in the hip until they no longer worked for her in 2021. She had surgery to fix her hip, but is unsure what was done. She reports she had surgery in 12/2021, which was revised in 06/2022. She has not had any interventions since, but her pain has largerly remained that same. It is sharp/achy pain on the lateral aspect of the left hip. Pain is worse with transitional motions from sitting to standing and vice versa, but nothing makes it better.    Physical Therapy: The patient has done six or more weeks of physical therapy in the past six months with minimal improvement  Other Conservative Measures she has tried: Chiropractic, Acupuncture, Heating Pad, Ice, and Massage/myofascial release  Classes of medications tried in the past: Acetaminophen    Review of Systems   13-point ROS done and negative except for HPI.     Current Outpatient Medications   Medication Instructions    acetaminophen (TYLENOL) 650 mg, oral, Every 6 hours PRN    albuterol 2.5 mg /3 mL (0.083 %) nebulizer solution use 3 ml via nebulizer four times daily over 5-15 minutes for wheezing and shortness of breath    albuterol 90 mcg/actuation inhaler INHALE TWO PUFFS BY MOUTH EVERY 4 HOURS AS NEEDED as instructed for wheezing/shortness of breath    atorvastatin (LIPITOR) 40 mg, oral, Nightly    BD Ultra-Fine Mini Pen Needle 31 gauge x 3/16\" needle     blood sugar diagnostic strip CHECK BLOOD SUGARS 4 TIMES DAILY,  ICD-10: E11.9    COD LIVER OIL ORAL Once daily for immune health    dapagliflozin propanediol (Farxiga) 10 mg 1 tablet, oral, Every morning    estradiol (Estrace) 0.01 % (0.1 mg/gram) vaginal cream 1 g in applicator nightly.<BR>Disp # 3,  42.5 gm tubes " (90Days)<BR>Refill #    famotidine (PEPCID) 40 mg, oral, 2 times daily    Lantus Solostar U-100 Insulin 100 unit/mL (3 mL) pen Inject 15 units daily in the morning    levETIRAcetam (Keppra) 750 mg tablet     meclizine (Antivert) 25 mg tablet 1 tablet, oral, Daily PRN    medroxyPROGESTERone (DEPO-PROVERA) 150 mg, intramuscular, Every 3 months,  INJECT EVERY 10 WEEKS, BRING TO OFFICE    metFORMIN  mg 24 hr tablet metFORMIN HCl  MG Oral Tablet Extended Release 24 Hour   Quantity: 30  Refills: 0        Start : 22-Sep-2015   Active    metoprolol succinate XL (Toprol-XL) 100 mg 24 hr tablet Metoprolol Succinate  MG Oral Tablet Extended Release 24 Hour   Quantity: 90  Refills: 0        Start : 24-Nov-2021   Active    NovoLOG Flexpen U-100 Insulin 100 unit/mL (3 mL) pen INJECT 4 UNITS SUBCUTANEOUSLY 3 TIMES A DAY WITH MEALS PLUS SLIDING SCALE #1. TOTAL DAILY DOSE 30 UNITS    omeprazole (PRILOSEC) 40 mg, oral, Daily RT    ondansetron ODT (ZOFRAN-ODT) 4 mg, oral, 3 times daily PRN    oxybutynin XL (Ditropan-XL) 10 mg 24 hr tablet Oxybutynin Chloride ER 10 MG Oral Tablet Extended Release 24 Hour   Quantity: 90  Refills: 0        Start : 27-Sep-2021   Active    Ozempic 2 mg/dose (8 mg/3 mL) pen injector     predniSONE (DELTASONE) 20 mg, oral, Every 24 hours    venlafaxine XR (Effexor-XR) 150 mg 24 hr capsule 1 capsule, oral, Daily       Past Medical History:   Diagnosis Date    Diverticulosis of large intestine without perforation or abscess without bleeding     Diverticulosis of large intestine without hemorrhage    Encounter for gynecological examination (general) (routine) without abnormal findings 08/29/2022    Encounter for well woman exam with routine gynecological exam    Personal history of other diseases of the female genital tract     History of dysfunctional uterine bleeding    Personal history of other diseases of the nervous system and sense organs     History of migraine    Personal history of  other diseases of the respiratory system 04/29/2016    History of acute sinusitis    Personal history of other medical treatment 08/29/2022    History of screening mammography    Personal history of urinary calculi     History of renal calculi    Trochanteric bursitis, right hip     Greater trochanteric bursitis of both hips        Past Surgical History:   Procedure Laterality Date    BREAST BIOPSY      LAPAROSCOPY DIAGNOSTIC / BIOPSY / ASPIRATION / LYSIS  04/11/2016    Laparoscopy (Diagnostic)        Family History   Family history unknown: Yes        Allergies   Allergen Reactions    Oxycodone-Acetaminophen Unknown     vomiting    Sutures Other     Catgut sutures rejected got infection    Amoxicillin-Pot Clavulanate Other     yeast infection        Objective     There were no vitals filed for this visit.     Physical Exam  General: NAD, well groomed, well nourished  Eyes: Non-icteric sclera, EOMI  Ears, Nose, Mouth, and Throat: External ears and nose appear to be without deformity or rash. No lesions or masses noted. Hearing is grossly intact.   Neck: Trachea midline  Respiratory: Nonlabored breathing   Cardiovascular: no peripheral edema   Skin: No rashes or open lesions/ulcers identified on skin.    Back:   Palpation: Tenderness to palpation over left lateral and posterior hip.     Hip: Pain over left greater trochanter. and Pain reproduced with internal/external rotation.    Neurologic:   Cranial nerves grossly intact.   Strength 5/5 and symmetric plantar/dorsiflexion   Sensation: Normal to light touch throughout, pinprick intact throughout.  DTRs:normal and symmetric throughout    Psychiatric: Alert, orientation to person, place, and time. Cooperative.    Imaging personally reviewed and independently interpreted.    Assessment/Plan   Jody Joyce is a 54 y.o. female with hx of DM2, chronic left hip pain s/p hip surgery x2, now presenting with persistent left hip pain. Her last surgery was 06/2022, and she  has not had any interventions since.  She has failed conservative therapy including oral medications and physical therapy. Physical exam is positive as above for tenderness at the left greater trochanter.     Plan:  -Will schedule patient for left greater trochanter bursa injection under ultrasound  -Advised patient to use TENs unit to the affected area.     The patient has failed treatment with : Physical therapy , three or more classes of medications, alternative therapies, they are not a surgical candidate, have significant limitations in their sleep quality due to the pain, have significant limitations of their quality of life due to the pain, and have significant impairments of their activities of daily living (ADLs) due to the pain    We discussed  the risks, benefits and alternatives of the procedure including but not limited to: , Lack of efficacy , Transiently worsening pain , Bleeding, Infection , Nerve Damage, and The patient or her decision-maker expressed understanding and agreed to proceed. All questions were answered to the best of my ability.     Follow up: After procedure    The patient was invited to contact us back anytime with any questions or concerns and follow-up with us in the office as needed.     There are no diagnoses linked to this encounter.    This note was generated with the aid of dictation software, there may be typos despite my attempts at proofreading.     Ko Richardson MD  Chronic Pain Medicine Fellow  [facility]

## 2024-08-06 DIAGNOSIS — N80.9 ENDOMETRIOSIS: ICD-10-CM

## 2024-08-06 RX ORDER — MEDROXYPROGESTERONE ACETATE 150 MG/ML
INJECTION, SUSPENSION INTRAMUSCULAR
Qty: 1 ML | Refills: 0 | Status: SHIPPED | OUTPATIENT
Start: 2024-08-06

## 2024-08-08 ENCOUNTER — HOSPITAL ENCOUNTER (OUTPATIENT)
Facility: HOSPITAL | Age: 54
Discharge: HOME | End: 2024-08-08
Payer: COMMERCIAL

## 2024-08-08 VITALS
HEART RATE: 64 BPM | DIASTOLIC BLOOD PRESSURE: 82 MMHG | BODY MASS INDEX: 32.66 KG/M2 | WEIGHT: 173 LBS | OXYGEN SATURATION: 100 % | TEMPERATURE: 97.7 F | HEIGHT: 61 IN | SYSTOLIC BLOOD PRESSURE: 145 MMHG | RESPIRATION RATE: 18 BRPM

## 2024-08-08 DIAGNOSIS — M76.32 ILIOTIBIAL BAND SYNDROME OF LEFT SIDE: ICD-10-CM

## 2024-08-08 DIAGNOSIS — M70.62 TROCHANTERIC BURSITIS, LEFT HIP: ICD-10-CM

## 2024-08-08 LAB — GLUCOSE BLD MANUAL STRIP-MCNC: 185 MG/DL (ref 74–99)

## 2024-08-08 PROCEDURE — 20611 DRAIN/INJ JOINT/BURSA W/US: CPT | Performed by: PAIN MEDICINE

## 2024-08-08 PROCEDURE — 82947 ASSAY GLUCOSE BLOOD QUANT: CPT

## 2024-08-08 RX ORDER — METHYLPREDNISOLONE ACETATE 40 MG/ML
INJECTION, SUSPENSION INTRA-ARTICULAR; INTRALESIONAL; INTRAMUSCULAR; SOFT TISSUE
Status: DISPENSED
Start: 2024-08-08 | End: 2024-08-08

## 2024-08-08 RX ORDER — BUPIVACAINE HYDROCHLORIDE 5 MG/ML
INJECTION, SOLUTION EPIDURAL; INTRACAUDAL
Status: DISPENSED
Start: 2024-08-08 | End: 2024-08-08

## 2024-08-08 ASSESSMENT — PAIN - FUNCTIONAL ASSESSMENT
PAIN_FUNCTIONAL_ASSESSMENT: WONG-BAKER FACES
PAIN_FUNCTIONAL_ASSESSMENT: 0-10
PAIN_FUNCTIONAL_ASSESSMENT: WONG-BAKER FACES

## 2024-08-08 ASSESSMENT — COLUMBIA-SUICIDE SEVERITY RATING SCALE - C-SSRS
2. HAVE YOU ACTUALLY HAD ANY THOUGHTS OF KILLING YOURSELF?: NO
1. IN THE PAST MONTH, HAVE YOU WISHED YOU WERE DEAD OR WISHED YOU COULD GO TO SLEEP AND NOT WAKE UP?: NO
6. HAVE YOU EVER DONE ANYTHING, STARTED TO DO ANYTHING, OR PREPARED TO DO ANYTHING TO END YOUR LIFE?: NO

## 2024-08-08 ASSESSMENT — PAIN SCALES - GENERAL
PAINLEVEL_OUTOF10: 6
PAINLEVEL_OUTOF10: 5 - MODERATE PAIN
PAINLEVEL_OUTOF10: 5 - MODERATE PAIN

## 2024-08-08 NOTE — H&P
"HISTORY AND PHYSICAL    History Of Present Illness  Jody Joyce \"Edilia" is a 54 y.o. female presenting with chronic left hip pain here for  left greater trochanter bursa injection under ultrasound ; she denies any recent antibiotic use or infections, she denies any blood thinner use, and she denies contrast or local anesthetic allergies.    Past Medical History  Past Medical History:   Diagnosis Date    Diverticulosis of large intestine without perforation or abscess without bleeding     Diverticulosis of large intestine without hemorrhage    Encounter for gynecological examination (general) (routine) without abnormal findings 08/29/2022    Encounter for well woman exam with routine gynecological exam    Personal history of other diseases of the female genital tract     History of dysfunctional uterine bleeding    Personal history of other diseases of the nervous system and sense organs     History of migraine    Personal history of other diseases of the respiratory system 04/29/2016    History of acute sinusitis    Personal history of other medical treatment 08/29/2022    History of screening mammography    Personal history of urinary calculi     History of renal calculi    Trochanteric bursitis, right hip     Greater trochanteric bursitis of both hips       Surgical History  Past Surgical History:   Procedure Laterality Date    BREAST BIOPSY      LAPAROSCOPY DIAGNOSTIC / BIOPSY / ASPIRATION / LYSIS  04/11/2016    Laparoscopy (Diagnostic)        Social History  She reports that she has never smoked. She has never been exposed to tobacco smoke. She has never used smokeless tobacco. No history on file for alcohol use and drug use.    Family History  Family History   Family history unknown: Yes        Allergies  Oxycodone-acetaminophen, Sutures, and Amoxicillin-pot clavulanate    Review of Systems   12 point ROS done and negative except for the above.   Physical Exam     General: NAD, well groomed, well " nourished  Eyes: Non-icteric sclera, EOMI  Ears, Nose, Mouth, and Throat: External ears and nose appear to be without deformity or rash. No lesions or masses noted. Hearing is grossly intact.   Neck: Trachea midline  Respiratory: Nonlabored breathing   Cardiovascular: No peripheral edema   Skin: No rashes or open lesions/ulcers identified on skin.    Last Recorded Vitals  not currently breastfeeding.    Relevant Results           Assessment/Plan       Risks, benefits, alternatives discussed. All questions answered to the best of my ability. Patient agrees to proceed.   -We will proceed with planned procedure    VAS Pre-procedure: 7/10 (10 being the worst)  VAS Post-procedure: see procedure note      Ko Richardson MD  Chronic Pain Fellow  [facility]

## 2024-08-20 ENCOUNTER — APPOINTMENT (OUTPATIENT)
Facility: HOSPITAL | Age: 54
End: 2024-08-20
Payer: COMMERCIAL

## 2024-09-03 ENCOUNTER — TELEPHONE (OUTPATIENT)
Dept: PAIN MEDICINE | Facility: HOSPITAL | Age: 54
End: 2024-09-03
Payer: COMMERCIAL

## 2024-09-03 NOTE — TELEPHONE ENCOUNTER
Ms. Joyce called saying the the left trochanter bursa injection from 8/8 provided some relief but has exacerbated her SI joint. She says her hip pain is tolerable now. However, she has had SI joint injections in the past (2-3 years ago) and that usually provides relief.     Would you be ok scheduling a SI injection or would you want to re-evaluate her in the office?    Thank you

## 2024-09-06 ENCOUNTER — PREP FOR PROCEDURE (OUTPATIENT)
Dept: PAIN MEDICINE | Facility: HOSPITAL | Age: 54
End: 2024-09-06

## 2024-09-06 ENCOUNTER — APPOINTMENT (OUTPATIENT)
Dept: GASTROENTEROLOGY | Facility: CLINIC | Age: 54
End: 2024-09-06
Payer: COMMERCIAL

## 2024-09-06 DIAGNOSIS — M46.1 SACROILIITIS (CMS-HCC): ICD-10-CM

## 2024-09-23 ENCOUNTER — APPOINTMENT (OUTPATIENT)
Dept: OBSTETRICS AND GYNECOLOGY | Facility: CLINIC | Age: 54
End: 2024-09-23
Payer: COMMERCIAL

## 2024-09-23 DIAGNOSIS — Z30.42 DEPO-PROVERA CONTRACEPTIVE STATUS: Primary | ICD-10-CM

## 2024-09-23 PROCEDURE — 96372 THER/PROPH/DIAG INJ SC/IM: CPT | Performed by: OBSTETRICS & GYNECOLOGY

## 2024-09-23 RX ORDER — LEVETIRACETAM 1000 MG/1
TABLET ORAL
COMMUNITY
Start: 2024-09-03

## 2024-09-23 RX ORDER — BROMPHENIRAMINE MALEATE, PSEUDOEPHEDRINE HYDROCHLORIDE, AND DEXTROMETHORPHAN HYDROBROMIDE 2; 30; 10 MG/5ML; MG/5ML; MG/5ML
10 SYRUP ORAL EVERY 6 HOURS PRN
COMMUNITY
Start: 2024-09-16

## 2024-09-23 RX ORDER — GLIPIZIDE 10 MG/1
TABLET ORAL
COMMUNITY
Start: 2024-05-12

## 2024-09-23 RX ORDER — MENTHOL/SORBITOL
250 LOZENGE MUCOUS MEMBRANE 2 TIMES DAILY
COMMUNITY

## 2024-09-23 RX ORDER — NIRMATRELVIR AND RITONAVIR 150-100 MG
KIT ORAL
COMMUNITY
Start: 2024-09-14

## 2024-09-23 RX ORDER — RIZATRIPTAN BENZOATE 10 MG/1
10 TABLET, ORALLY DISINTEGRATING ORAL
COMMUNITY
Start: 2024-06-05

## 2024-09-23 RX ORDER — LOSARTAN POTASSIUM 50 MG/1
25 TABLET ORAL DAILY
COMMUNITY

## 2024-09-23 RX ORDER — PANTOPRAZOLE SODIUM 40 MG/1
40 TABLET, DELAYED RELEASE ORAL
COMMUNITY
Start: 2024-07-15

## 2024-09-23 RX ORDER — MEDROXYPROGESTERONE ACETATE 150 MG/ML
150 INJECTION, SUSPENSION INTRAMUSCULAR ONCE
Status: COMPLETED | OUTPATIENT
Start: 2024-09-23 | End: 2024-09-23

## 2024-09-23 RX ORDER — BLOOD-GLUCOSE SENSOR
EACH MISCELLANEOUS
COMMUNITY
Start: 2024-05-19

## 2024-09-23 NOTE — PROGRESS NOTES
"Patient ID: Jody Joyce \"Pita\" is a 54 y.o. female.    Medroxyprogesterone Acetate 150mg/ml    Given 9/23/24  IM Right Deltoid  Lot# [id number]  Exp: 5/2025  NDC: [id number]  "

## 2024-10-16 DIAGNOSIS — N80.9 ENDOMETRIOSIS: ICD-10-CM

## 2024-10-16 RX ORDER — MEDROXYPROGESTERONE ACETATE 150 MG/ML
INJECTION, SUSPENSION INTRAMUSCULAR
Qty: 1 ML | Refills: 2 | Status: SHIPPED | OUTPATIENT
Start: 2024-10-16

## 2024-10-21 ENCOUNTER — APPOINTMENT (OUTPATIENT)
Dept: OBSTETRICS AND GYNECOLOGY | Facility: CLINIC | Age: 54
End: 2024-10-21
Payer: COMMERCIAL

## 2024-10-21 VITALS
HEIGHT: 61 IN | SYSTOLIC BLOOD PRESSURE: 122 MMHG | WEIGHT: 181 LBS | BODY MASS INDEX: 34.17 KG/M2 | DIASTOLIC BLOOD PRESSURE: 60 MMHG

## 2024-10-21 DIAGNOSIS — Z12.31 BREAST CANCER SCREENING BY MAMMOGRAM: ICD-10-CM

## 2024-10-21 DIAGNOSIS — E11.8 CONTROLLED DIABETES MELLITUS TYPE 2 WITH COMPLICATIONS, UNSPECIFIED WHETHER LONG TERM INSULIN USE (MULTI): ICD-10-CM

## 2024-10-21 DIAGNOSIS — Z01.419 WELL WOMAN EXAM WITH ROUTINE GYNECOLOGICAL EXAM: Primary | ICD-10-CM

## 2024-10-21 PROCEDURE — 4010F ACE/ARB THERAPY RXD/TAKEN: CPT | Performed by: OBSTETRICS & GYNECOLOGY

## 2024-10-21 PROCEDURE — 1036F TOBACCO NON-USER: CPT | Performed by: OBSTETRICS & GYNECOLOGY

## 2024-10-21 PROCEDURE — 3008F BODY MASS INDEX DOCD: CPT | Performed by: OBSTETRICS & GYNECOLOGY

## 2024-10-21 PROCEDURE — 87624 HPV HI-RISK TYP POOLED RSLT: CPT

## 2024-10-21 PROCEDURE — 3078F DIAST BP <80 MM HG: CPT | Performed by: OBSTETRICS & GYNECOLOGY

## 2024-10-21 PROCEDURE — 88175 CYTOPATH C/V AUTO FLUID REDO: CPT

## 2024-10-21 PROCEDURE — 99396 PREV VISIT EST AGE 40-64: CPT | Performed by: OBSTETRICS & GYNECOLOGY

## 2024-10-21 PROCEDURE — 3074F SYST BP LT 130 MM HG: CPT | Performed by: OBSTETRICS & GYNECOLOGY

## 2024-10-21 RX ORDER — TRIAMCINOLONE ACETONIDE 1 MG/G
CREAM TOPICAL
COMMUNITY
Start: 2024-10-11

## 2024-10-21 RX ORDER — ALBUTEROL SULFATE AND BUDESONIDE 90; 80 UG/1; UG/1
AEROSOL, METERED RESPIRATORY (INHALATION)
COMMUNITY
Start: 2024-10-11

## 2024-10-21 NOTE — PROGRESS NOTES
Chief Complaint   Patient presents with    Annual Exam     Annual Exam with Pap Smear and Order for Mammogram    G0    Would like to discuss discontinuing Depo injections.     in room with patient.   Met w pt and     Patient is 54 years old and on Depo for endometriosis.  Discussed alternatives, pros and cons of coming off versus staying on Depo-Provera.  At this point patient would like to not renew her prescription and see where things go from there.    In addition, patient has had significant pain and discomfort with attempts at penetrative intercourse.  Last year we spoke about vaginal estrogen, lubricants and vaginal dilators.  Patient tried using vaginal dilators but is not comfortable using them (from a psychologic point of view).  Feels this is her related to her upbringing and attitudes toward sexuality.    Managing her diabetes but not happy with progress and weight gain.    REVIEW OF SYSTEMS    Please see HPI for reported pertinent positives, which would supersede this ROS    Constitutional:  Denies fever, chills, wt gain or loss, fatigue    Genito-Urinary:  Denies genital lesion or sores, vaginal dryness, itching  or pain.  No abnormal vaginal discharge or unexplained vaginal bleeding.  No dysuria, urinary incontinence or frequency.  Denies pelvic pain, dysmenorrhea or dyspareunia.    Eyes:  Denies vision changes, dryness  ENT:  No hearing loss, sinus pain or congestion, nosebleeds  Cardiovascular:  No chest pain or palpitations  Respiratory:  No SOB, cough, wheezing  GI:  No Nausea, vomiting, diarrhea, constipation, abdominal pain  Musculoskeletal:  No new back pain. joint pain, peripheral edema  Skin:  No rash or skin lesion  Neurologic:  No HA, numbness or dizziness  Psychiatric:  No new anxiety or depression  Endocrine:  No loss of hair or hirsutism  Hematologic/lymphatic:  No swollen lymph nodes.  No reported tendency for easy bruising or bleeding    Patient admits to no other systemic  complaints      Vitals:    10/21/24 1746   BP: 122/60       PHYSICAL EXAM:    PSYCH:  Pt is alert, oriented and cooperative    SKIN: warm, dry, w/o lesion    HEAD AND FACE:  External inspection of eyes, ears, functional cranial nerves normal and intact    THYROID:  No thyromegaly    CARDIOVASCULAR:  Warm and well Perfused    PULMONARY:  No respiratory distress    ABDOMEN:  soft, nontender.  No mass or organomegaly.      BREAST:  Breasts are symmetric to inspection and palpation.  No mass palpable bilaterally.  There is no axillary lymphadenopathy    PELVIC:    External genitalia, urethra, perianal region normal to inspection and palpation if indicated.  No inguinal LA    Vagina without lesions.    Cervix seen and visually normal  Introitus easily admits speculum which is 2 cm wide.  Patient reasonably comfortable during this portion of exam.    Bimanual exam:      No pelvic mass palpable    Uterus nontender, midline in pelvis    No adnexal masses or tenderness      Assessment/Plan    Diagnoses and all orders for this visit:  Well woman exam with routine gynecological exam -will hold on renewing Depo-Provera at this point.  -     THINPREP PAP TEST  Breast cancer screening by mammogram  -     BI mammo bilateral screening tomosynthesis; Future  Insertional dyspareunia -no doubt menopause is playing a role in this.  However patient feels this is significantly related to her upbringing and attitudes toward sexuality.  Towards this end has not been able to use dilators.  This could be a potential satisfactory solution, but the couple will discuss whether this is the best course of action.  Discussed referral to Dr. Mary Kay Anderson.    [facility] - Refer endocrine/Maximiliann

## 2024-10-23 DIAGNOSIS — N80.9 ENDOMETRIOSIS: ICD-10-CM

## 2024-10-23 RX ORDER — MEDROXYPROGESTERONE ACETATE 150 MG/ML
INJECTION, SUSPENSION INTRAMUSCULAR
Refills: 2 | OUTPATIENT
Start: 2024-10-23

## 2024-10-29 LAB
CYTOLOGY CMNT CVX/VAG CYTO-IMP: NORMAL
HPV HR 12 DNA GENITAL QL NAA+PROBE: NEGATIVE
HPV HR GENOTYPES PNL CVX NAA+PROBE: NEGATIVE
HPV16 DNA SPEC QL NAA+PROBE: NEGATIVE
HPV18 DNA SPEC QL NAA+PROBE: NEGATIVE
LAB AP CONTRACEPTIVE HISTORY: NORMAL
LAB AP HPV GENOTYPE QUESTION: YES
LAB AP HPV HR: NORMAL
LABORATORY COMMENT REPORT: NORMAL
PATH REPORT.TOTAL CANCER: NORMAL

## 2024-11-26 ENCOUNTER — APPOINTMENT (OUTPATIENT)
Facility: HOSPITAL | Age: 54
End: 2024-11-26
Payer: COMMERCIAL

## 2024-11-29 ENCOUNTER — APPOINTMENT (OUTPATIENT)
Dept: BEHAVIORAL HEALTH | Facility: CLINIC | Age: 54
End: 2024-11-29
Payer: COMMERCIAL

## 2024-11-29 DIAGNOSIS — F41.8 ANXIETY ABOUT HEALTH: Primary | ICD-10-CM

## 2024-11-29 DIAGNOSIS — F52.5 FUNCTIONAL VAGINISMUS: ICD-10-CM

## 2024-11-29 PROCEDURE — 4010F ACE/ARB THERAPY RXD/TAKEN: CPT | Performed by: PSYCHOLOGIST

## 2024-11-29 PROCEDURE — 90791 PSYCH DIAGNOSTIC EVALUATION: CPT | Performed by: PSYCHOLOGIST

## 2024-11-29 NOTE — LETTER
"November 29, 2024     Dov Zhang MD  [id number] [address] [facility]  [address]  [facility] [address]    Patient: Pita Joyce   YOB: 1970   Date of Visit: 11/29/2024       Dear Dr. Dov Zhang MD:    Thank you for referring Pita Joyce to me for evaluation. Below are my notes for this consultation.  If you have questions, please do not hesitate to call me. I look forward to following your patient along with you.   I think she should be referred to pelvic floor PT and will need a prescription from you for this.    Sincerely,     Mary Kay Anderson, PhD      CC: No Recipients  ______________________________________________________________________________________    Initial Assessment  Virtual visit with audio and visual equipment  POS 10    Jany Lema is referred for sexual concerns related to GSM (genitourinary syndrome of menopause) and vaginismus.  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  She has no biological children by choice and had a tubal ligation but reports that she has been getting depo Provera injections for \"years\" due to endometriosis. This Monday will be the first time she has not had a depo shot and is going to see if problems with endo return and/or other menopause symptoms.  However, she has symptoms consistent with GSM and has developed vaginismus.  She has not used her prescribed vaginal estradiol cream.  I explained GSM (used visual as well) and the development of Vaginismus.  I also explained the need for the estradiol cream to restore the vulvovaginal tissue and the need for chronic use for lifetime. I also explained the concept of vaginismus as a result of anticipatory pain and the need for CBT using dilators to treat the anxiety.  The couple has chosen the Angela expandable dilator vs the static dilators but are instructed not to try any dilation until she has used her estradiol cream for " 12 weeks to address vaginal atrophy.  I have also instructed them to engage in non-penetrative sexual activity in order unpair sex and fear of pain.  Finally, I strongly suggest she be prescribed pelvic floor PT to help with any hypteronis.  She is to return to see me once she has begun using the dilator.

## 2024-11-29 NOTE — PROGRESS NOTES
"Initial Assessment  Virtual visit with audio and visual equipment  POS 10    Jany Lema is referred for sexual concerns related to GSM (genitourinary syndrome of menopause) and vaginismus.  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  She has no biological children by choice and had a tubal ligation but reports that she has been getting depo Provera injections for \"years\" due to endometriosis. This Monday will be the first time she has not had a depo shot and is going to see if problems with endo return and/or other menopause symptoms.  However, she has symptoms consistent with GSM and has developed vaginismus.  She has not used her prescribed vaginal estradiol cream.  I explained GSM (used visual as well) and the development of Vaginismus.  I also explained the need for the estradiol cream to restore the vulvovaginal tissue and the need for chronic use for lifetime. I also explained the concept of vaginismus as a result of anticipatory pain and the need for CBT using dilators to treat the anxiety.  The couple has chosen the Angela expandable dilator vs the static dilators but are instructed not to try any dilation until she has used her estradiol cream for 12 weeks to address vaginal atrophy.  I have also instructed them to engage in non-penetrative sexual activity in order unpair sex and fear of pain.  Finally, I strongly suggest she be prescribed pelvic floor PT to help with any hypteronis.  She is to return to see me once she has begun using the dilator.  "

## 2024-12-02 ENCOUNTER — APPOINTMENT (OUTPATIENT)
Dept: OBSTETRICS AND GYNECOLOGY | Facility: CLINIC | Age: 54
End: 2024-12-02
Payer: COMMERCIAL

## 2024-12-04 ENCOUNTER — OFFICE VISIT (OUTPATIENT)
Dept: PAIN MEDICINE | Facility: HOSPITAL | Age: 54
End: 2024-12-04
Payer: COMMERCIAL

## 2024-12-04 DIAGNOSIS — M46.1 SACROILIITIS (CMS-HCC): ICD-10-CM

## 2024-12-04 DIAGNOSIS — M70.62 TROCHANTERIC BURSITIS, LEFT HIP: Primary | ICD-10-CM

## 2024-12-04 PROCEDURE — 99213 OFFICE O/P EST LOW 20 MIN: CPT | Performed by: PAIN MEDICINE

## 2024-12-04 PROCEDURE — 4010F ACE/ARB THERAPY RXD/TAKEN: CPT | Performed by: PAIN MEDICINE

## 2024-12-04 ASSESSMENT — PAIN - FUNCTIONAL ASSESSMENT: PAIN_FUNCTIONAL_ASSESSMENT: 0-10

## 2024-12-04 ASSESSMENT — PAIN SCALES - GENERAL: PAINLEVEL_OUTOF10: 10 - WORST POSSIBLE PAIN

## 2024-12-04 NOTE — PROGRESS NOTES
Subjective   Patient ID: Jody Joyce is a 54 y.o. female with hx of DM2, chronic left hip pain s/p hip surgery x2, now presenting with persistent left hip pain. Patient states that she had been diagnosed with osteoarthritis in the left hip and been receiving steroid injections in the hip until they no longer worked for her in 2021. She had surgery to fix her hip, but is unsure what was done. She reports she had surgery in 12/2021, which was revised in 06/2022.     Patient here for follow-up. Patient had left trochanteric bursa injection on 8-8-2024 with 50% relief for only a few weeks.  Since then has had worsening left buttock pain.  Patient describes pain as sharp, aching, worse with transitional movements.  Pain sometimes radiates to the lateral aspect of the hip but denies radicular symptoms below that.  Reports weakness due to the pain but denies numbness or tingling sensation, no bowel or bladder incontinence, no dizziness or falls.     Patient is set to get an MRI lumbar spine ordered by her acupuncturist.  Discussed plan to follow-up on MRI results.    Physical Therapy: The patient has done six or more weeks of physical therapy in the past six months with minimal improvement  Other Conservative Measures she has tried: Chiropractic, Acupuncture, Heating Pad, Ice, and Massage/myofascial release  Classes of medications tried in the past: Acetaminophen    Review of Systems   13-point ROS done and negative except for HPI.     Current Outpatient Medications   Medication Instructions    acetaminophen (TYLENOL) 650 mg, Every 6 hours PRN    Airsupra 90-80 mcg/actuation inhaler INHALE TWO PUFFS BY MOUTH EVERY 4 HOURS AS NEEDED for up to 12 puffs in a day for cough    albuterol 2.5 mg /3 mL (0.083 %) nebulizer solution use 3 ml via nebulizer four times daily over 5-15 minutes for wheezing and shortness of breath    albuterol 90 mcg/actuation inhaler INHALE TWO PUFFS BY MOUTH EVERY 4 HOURS AS NEEDED as instructed  "for wheezing/shortness of breath    atorvastatin (LIPITOR) 40 mg, Nightly    BD Ultra-Fine Mini Pen Needle 31 gauge x 3/16\" needle     blood sugar diagnostic strip CHECK BLOOD SUGARS 4 TIMES DAILY,  ICD-10: E11.9    dapagliflozin propanediol (Farxiga) 10 mg 1 tablet, Every morning    estradiol (Estrace) 0.01 % (0.1 mg/gram) vaginal cream 1 g in applicator nightly.  Disp # 3,  42.5 gm tubes (90Days)  Refill #    famotidine (PEPCID) 40 mg, oral, 2 times daily    FreeStyle Jose 3 Sensor device USE AS DIRECTED TO MONITOR BLOOD GLUCOSE    Lantus Solostar U-100 Insulin 100 unit/mL (3 mL) pen Inject 15 units daily in the morning    levETIRAcetam (Keppra) 1,000 mg tablet     losartan (COZAAR) 25 mg, Daily    meclizine (Antivert) 25 mg tablet 1 tablet, Daily PRN    medroxyPROGESTERone 150 mg/mL injection INJECT INTRAMUSCULARLY     EVERY 10 WEEKS, (BRING TO  OFFICE)    metoprolol succinate XL (Toprol-XL) 100 mg 24 hr tablet Metoprolol Succinate  MG Oral Tablet Extended Release 24 Hour   Quantity: 90  Refills: 0        Start : 24-Nov-2021   Active    NovoLOG Flexpen U-100 Insulin 100 unit/mL (3 mL) pen INJECT 4 UNITS SUBCUTANEOUSLY 3 TIMES A DAY WITH MEALS PLUS SLIDING SCALE #1. TOTAL DAILY DOSE 30 UNITS    ondansetron ODT (ZOFRAN-ODT) 4 mg, 3 times daily PRN    oxybutynin XL (Ditropan-XL) 10 mg 24 hr tablet Oxybutynin Chloride ER 10 MG Oral Tablet Extended Release 24 Hour   Quantity: 90  Refills: 0        Start : 27-Sep-2021   Active    rizatriptan MLT (MAXALT-MLT) 10 mg, oral    triamcinolone (Kenalog) 0.1 % cream Apply to affected area twice daily as needed    venlafaxine XR (Effexor-XR) 150 mg 24 hr capsule 1 capsule, Daily       Past Medical History:   Diagnosis Date    COVID-19     Diverticulosis of large intestine without perforation or abscess without bleeding     Diverticulosis of large intestine without hemorrhage    Encounter for gynecological examination (general) (routine) without abnormal findings " 08/29/2022    Encounter for well woman exam with routine gynecological exam    Personal history of other diseases of the female genital tract     History of dysfunctional uterine bleeding    Personal history of other diseases of the nervous system and sense organs     History of migraine    Personal history of other diseases of the respiratory system 04/29/2016    History of acute sinusitis    Personal history of other medical treatment 08/29/2022    History of screening mammography    Personal history of urinary calculi     History of renal calculi    Trochanteric bursitis, right hip     Greater trochanteric bursitis of both hips        Past Surgical History:   Procedure Laterality Date    BREAST BIOPSY      LAPAROSCOPY DIAGNOSTIC / BIOPSY / ASPIRATION / LYSIS  04/11/2016    Laparoscopy (Diagnostic)        Family History   Family history unknown: Yes        Allergies   Allergen Reactions    Oxycodone-Acetaminophen Unknown     vomiting    Sutures Other     Catgut sutures rejected got infection    Amoxicillin-Pot Clavulanate Other     yeast infection        Objective     There were no vitals filed for this visit.     Physical Exam  General: NAD, well groomed, well nourished  Eyes: Non-icteric sclera, EOMI  Ears, Nose, Mouth, and Throat: External ears and nose appear to be without deformity or rash. No lesions or masses noted. Hearing is grossly intact.   Neck: Trachea midline  Respiratory: Nonlabored breathing   Cardiovascular: no peripheral edema   Skin: No rashes or open lesions/ulcers identified on skin.    Back:   Palpation: Tenderness to palpation over left lateral and posterior hip.   Positive SI compression, positive Gaenslen's, positive Indy's on the left    Hip: Pain over left greater trochanter. and Pain reproduced with internal/external rotation.    Neurologic:   Cranial nerves grossly intact.   Strength 5/5 and symmetric plantar/dorsiflexion   Sensation: Normal to light touch throughout, pinprick intact  throughout.  DTRs:normal and symmetric throughout    Psychiatric: Alert, orientation to person, place, and time. Cooperative.    Imaging personally reviewed and independently interpreted.    Assessment/Plan   Jody Joyce is a 54 y.o. female with hx of DM2, chronic left hip pain s/p hip surgery x2, now presenting with persistent left hip pain. Her last surgery was 06/2022, and she has not had any interventions since.  Patient had left trochanteric bursa injection on 8-8-2024 with 50% relief for only a few weeks.  Since then has had worsening left buttock pain.  On physical exam patient had positive left SI joint compression positive Gaenslen's, and positive Indy's.  Discussed plan to do left sacroiliac joint injection under fluoroscopy.  Patient is set to get an MRI lumbar spine ordered by her acupuncturist.  Discussed plan to follow-up on MRI results.    Plan:  -Left sacroiliac joint injection under fluoroscopy, discussed risks, benefits and alternative the procedure  - Obtain MRI lumbar spine as ordered by outside specialist, will follow-up with results  - Continue home exercise program as tolerated    The patient has failed treatment with : Physical therapy , three or more classes of medications, alternative therapies, they are not a surgical candidate, have significant limitations in their sleep quality due to the pain, have significant limitations of their quality of life due to the pain, and have significant impairments of their activities of daily living (ADLs) due to the pain    We discussed  the risks, benefits and alternatives of the procedure including but not limited to: , Lack of efficacy , Transiently worsening pain , Bleeding, Infection , Nerve Damage, and The patient or her decision-maker expressed understanding and agreed to proceed. All questions were answered to the best of my ability.     Follow up: After procedure    The patient was invited to contact us back anytime with any questions or  concerns and follow-up with us in the office as needed.     There are no diagnoses linked to this encounter.    This note was generated with the aid of dictation software, there may be typos despite my attempts at proofreading.     Rodolfo Pal MD  Pain Fellow

## 2024-12-09 ENCOUNTER — TELEPHONE (OUTPATIENT)
Dept: PAIN MEDICINE | Facility: CLINIC | Age: 54
End: 2024-12-09
Payer: COMMERCIAL

## 2024-12-09 NOTE — TELEPHONE ENCOUNTER
MsKash Isiah faxed me her MRI lumbar spine report. She is currently scheduled for SI injection with you on 12/26. Asking for you to review the report (uploaded under 'media') to see if it will change your recommendation    Thank you

## 2024-12-18 ENCOUNTER — PREP FOR PROCEDURE (OUTPATIENT)
Dept: PAIN MEDICINE | Facility: HOSPITAL | Age: 54
End: 2024-12-18
Payer: COMMERCIAL

## 2024-12-18 DIAGNOSIS — M54.16 LUMBAR RADICULOPATHY: ICD-10-CM

## 2024-12-24 NOTE — H&P
"Pain Management H&P    History Of Present Illness  Jody Joyce \"Edilia" is a 54 y.o. female presents for procedure state below. Endorses no changes in past medical history or medical health since last seen in clinic.      Past Medical History  She has a past medical history of COVID-19, Diverticulosis of large intestine without perforation or abscess without bleeding, Encounter for gynecological examination (general) (routine) without abnormal findings (08/29/2022), Personal history of other diseases of the female genital tract, Personal history of other diseases of the nervous system and sense organs, Personal history of other diseases of the respiratory system (04/29/2016), Personal history of other medical treatment (08/29/2022), Personal history of urinary calculi, and Trochanteric bursitis, right hip.    Surgical History  She has a past surgical history that includes Laparoscopy diagnostic / biopsy / aspiration / lysis (04/11/2016) and Breast biopsy.     Social History  She reports that she has never smoked. She has never been exposed to tobacco smoke. She has never used smokeless tobacco. She reports current alcohol use. She reports that she does not use drugs.    Family History  Family History   Family history unknown: Yes        Allergies  Oxycodone-acetaminophen, Sutures, and Amoxicillin-pot clavulanate    Review of Symptoms:   Constitutional: Negative for chills, diaphoresis or fever  HENT: Negative for neck swelling  Eyes:.  Negative for eye pain  Respiratory:.  Negative for cough, shortness of breath or wheezing    Cardiovascular:.  Negative for chest pain or palpitations  Gastrointestinal:.  Negative for abdominal pain, nausea and vomiting  Genitourinary:.  Negative for urgency  Musculoskeletal:  Positive for back pain. Positive for joint pain. Denies falls within the past 3 months.  Skin: Negative for wounds or itching   Neurological: Negative for dizziness, seizures, loss of consciousness and " "weakness  Endo/Heme/Allergies: Does not bruise/bleed easily  Psychiatric/Behavioral: Negative for depression. The patient does not appear anxious.      Pre-sedation Evaluation  ASA class 3  Mallampati score 2     PHYSICAL EXAM  Vitals signs reviewed  Constitutional:       General: Not in acute distress     Appearance: Normal appearance. Not ill-appearing.  HENT:     Head: Normocephalic and atraumatic  Eyes:     Conjunctiva/sclera: Conjunctivae normal  Cardiovascular:     Rate and Rhythm: Normal rate and regular rhythm  Pulmonary:     Effort: No respiratory distress  Abdominal:     Palpations: Abdomen is soft  Musculoskeletal: SPEARS  Skin:     General: Skin is warm and dry  Neurological:     General: No focal deficit present  Psychiatric:         Mood and Affect: Mood normal         Behavior: Behavior normal     Last Recorded Vitals  There were no vitals taken for this visit.    Relevant Results  Current Outpatient Medications   Medication Instructions    acetaminophen (TYLENOL) 650 mg, Every 6 hours PRN    Airsupra 90-80 mcg/actuation inhaler INHALE TWO PUFFS BY MOUTH EVERY 4 HOURS AS NEEDED for up to 12 puffs in a day for cough    albuterol 2.5 mg /3 mL (0.083 %) nebulizer solution use 3 ml via nebulizer four times daily over 5-15 minutes for wheezing and shortness of breath    albuterol 90 mcg/actuation inhaler INHALE TWO PUFFS BY MOUTH EVERY 4 HOURS AS NEEDED as instructed for wheezing/shortness of breath    atorvastatin (LIPITOR) 40 mg, Nightly    BD Ultra-Fine Mini Pen Needle 31 gauge x 3/16\" needle     blood sugar diagnostic strip CHECK BLOOD SUGARS 4 TIMES DAILY,  ICD-10: E11.9    dapagliflozin propanediol (Farxiga) 10 mg 1 tablet, Every morning    estradiol (Estrace) 0.01 % (0.1 mg/gram) vaginal cream 1 g in applicator nightly.  Disp # 3,  42.5 gm tubes (90Days)  Refill #    famotidine (PEPCID) 40 mg, oral, 2 times daily    FreeStyle Jose 3 Sensor device USE AS DIRECTED TO MONITOR BLOOD GLUCOSE    Lantus " "Solostar U-100 Insulin 100 unit/mL (3 mL) pen Inject 15 units daily in the morning    levETIRAcetam (Keppra) 1,000 mg tablet     losartan (COZAAR) 25 mg, Daily    meclizine (Antivert) 25 mg tablet 1 tablet, Daily PRN    medroxyPROGESTERone 150 mg/mL injection INJECT INTRAMUSCULARLY     EVERY 10 WEEKS, (BRING TO  OFFICE)    metoprolol succinate XL (Toprol-XL) 100 mg 24 hr tablet Metoprolol Succinate  MG Oral Tablet Extended Release 24 Hour   Quantity: 90  Refills: 0        Start : 24-Nov-2021   Active    NovoLOG Flexpen U-100 Insulin 100 unit/mL (3 mL) pen INJECT 4 UNITS SUBCUTANEOUSLY 3 TIMES A DAY WITH MEALS PLUS SLIDING SCALE #1. TOTAL DAILY DOSE 30 UNITS    ondansetron ODT (ZOFRAN-ODT) 4 mg, 3 times daily PRN    oxybutynin XL (Ditropan-XL) 10 mg 24 hr tablet Oxybutynin Chloride ER 10 MG Oral Tablet Extended Release 24 Hour   Quantity: 90  Refills: 0        Start : 27-Sep-2021   Active    rizatriptan MLT (MAXALT-MLT) 10 mg, oral    triamcinolone (Kenalog) 0.1 % cream Apply to affected area twice daily as needed    venlafaxine XR (Effexor-XR) 150 mg 24 hr capsule 1 capsule, Daily         MR lumbar spine wo IV contrast      No image results found.       No diagnosis found.     ASSESSMENT/PLAN  Jody Joyce \"Edilia" is a 54 y.o. female here for L5-S1 interlaminar epidural steroid injection under fluoroscopy    Patient denies any recent antibiotic use or infections, denies any blood thinner use, and denies contrast or local anesthetic allergies     Risks, benefits, alternatives discussed. All questions answered to the best of my ability. Patient agrees to proceed.      Our plan is as follows:  - Proceed with aforementioned procedure          Rodolfo Pal MD   Pain fellow     "

## 2024-12-26 ENCOUNTER — APPOINTMENT (OUTPATIENT)
Facility: HOSPITAL | Age: 54
End: 2024-12-26
Payer: COMMERCIAL

## 2024-12-26 ENCOUNTER — HOSPITAL ENCOUNTER (OUTPATIENT)
Facility: HOSPITAL | Age: 54
Discharge: HOME | End: 2024-12-26
Payer: COMMERCIAL

## 2024-12-26 VITALS
RESPIRATION RATE: 15 BRPM | SYSTOLIC BLOOD PRESSURE: 135 MMHG | OXYGEN SATURATION: 96 % | BODY MASS INDEX: 34.55 KG/M2 | HEART RATE: 85 BPM | WEIGHT: 183 LBS | TEMPERATURE: 98.1 F | DIASTOLIC BLOOD PRESSURE: 75 MMHG | HEIGHT: 61 IN

## 2024-12-26 DIAGNOSIS — M54.16 LUMBAR RADICULOPATHY: ICD-10-CM

## 2024-12-26 PROCEDURE — 62323 NJX INTERLAMINAR LMBR/SAC: CPT | Performed by: PAIN MEDICINE

## 2024-12-26 PROCEDURE — 99152 MOD SED SAME PHYS/QHP 5/>YRS: CPT | Performed by: PAIN MEDICINE

## 2024-12-26 PROCEDURE — 2500000004 HC RX 250 GENERAL PHARMACY W/ HCPCS (ALT 636 FOR OP/ED): Performed by: PAIN MEDICINE

## 2024-12-26 PROCEDURE — 2550000001 HC RX 255 CONTRASTS: Performed by: PAIN MEDICINE

## 2024-12-26 RX ORDER — LIDOCAINE HYDROCHLORIDE 5 MG/ML
INJECTION, SOLUTION INFILTRATION; INTRAVENOUS
Status: COMPLETED | OUTPATIENT
Start: 2024-12-26 | End: 2024-12-26

## 2024-12-26 RX ORDER — FENTANYL CITRATE 50 UG/ML
INJECTION, SOLUTION INTRAMUSCULAR; INTRAVENOUS
Status: DISPENSED
Start: 2024-12-26 | End: 2024-12-26

## 2024-12-26 RX ORDER — FENTANYL CITRATE 50 UG/ML
INJECTION, SOLUTION INTRAMUSCULAR; INTRAVENOUS
Status: COMPLETED | OUTPATIENT
Start: 2024-12-26 | End: 2024-12-26

## 2024-12-26 RX ORDER — MIDAZOLAM HYDROCHLORIDE 1 MG/ML
INJECTION, SOLUTION INTRAMUSCULAR; INTRAVENOUS
Status: COMPLETED | OUTPATIENT
Start: 2024-12-26 | End: 2024-12-26

## 2024-12-26 RX ORDER — METHYLPREDNISOLONE ACETATE 40 MG/ML
INJECTION, SUSPENSION INTRA-ARTICULAR; INTRALESIONAL; INTRAMUSCULAR; SOFT TISSUE
Status: DISCONTINUED
Start: 2024-12-26 | End: 2024-12-26 | Stop reason: WASHOUT

## 2024-12-26 RX ORDER — MIDAZOLAM HYDROCHLORIDE 1 MG/ML
INJECTION INTRAMUSCULAR; INTRAVENOUS
Status: DISPENSED
Start: 2024-12-26 | End: 2024-12-26

## 2024-12-26 RX ORDER — LIDOCAINE HYDROCHLORIDE 5 MG/ML
INJECTION, SOLUTION INFILTRATION; INTRAVENOUS
Status: DISPENSED
Start: 2024-12-26 | End: 2024-12-26

## 2024-12-26 RX ORDER — DEXAMETHASONE SODIUM PHOSPHATE 10 MG/ML
INJECTION INTRAMUSCULAR; INTRAVENOUS
Status: DISPENSED
Start: 2024-12-26 | End: 2024-12-26

## 2024-12-26 RX ORDER — DEXAMETHASONE SODIUM PHOSPHATE 10 MG/ML
INJECTION INTRAMUSCULAR; INTRAVENOUS
Status: COMPLETED | OUTPATIENT
Start: 2024-12-26 | End: 2024-12-26

## 2024-12-26 ASSESSMENT — PAIN SCALES - GENERAL
PAINLEVEL_OUTOF10: 8
PAINLEVEL_OUTOF10: 9
PAINLEVEL_OUTOF10: 8
PAINLEVEL_OUTOF10: 6
PAINLEVEL_OUTOF10: 6

## 2024-12-26 ASSESSMENT — COLUMBIA-SUICIDE SEVERITY RATING SCALE - C-SSRS
1. IN THE PAST MONTH, HAVE YOU WISHED YOU WERE DEAD OR WISHED YOU COULD GO TO SLEEP AND NOT WAKE UP?: NO
2. HAVE YOU ACTUALLY HAD ANY THOUGHTS OF KILLING YOURSELF?: NO
6. HAVE YOU EVER DONE ANYTHING, STARTED TO DO ANYTHING, OR PREPARED TO DO ANYTHING TO END YOUR LIFE?: NO

## 2024-12-26 ASSESSMENT — PAIN - FUNCTIONAL ASSESSMENT
PAIN_FUNCTIONAL_ASSESSMENT: 0-10
PAIN_FUNCTIONAL_ASSESSMENT: 0-10
PAIN_FUNCTIONAL_ASSESSMENT: WONG-BAKER FACES
PAIN_FUNCTIONAL_ASSESSMENT: 0-10
PAIN_FUNCTIONAL_ASSESSMENT: WONG-BAKER FACES

## 2024-12-26 NOTE — DISCHARGE INSTRUCTIONS
DISCHARGE INSTRUCTIONS FOR INJECTIONS     You underwent lumbar interlaminar epidural steroid injection today    After most injections, it is recommended that you relax and limit your activity for the remainder of the day unless you have been told otherwise by your pain physician.  You should not drive a car, operate machinery, or make important legal decisions unless otherwise directed by your pain physician.  You may resume your normal activity, including exercise, tomorrow.      Keep a written pain diary of how much pain relief you experienced following the injection procedure and the length of time of pain relief you experienced pain relief. Following diagnostic injections like medial branch nerve blocks, sacroiliac joint blocks, stellate ganglion injections and other blocks, it is very important you record the specific amount of pain relief you experienced immediately after the injectionand how long it lasted. Your doctor will ask you for this information at your follow up visit.     For all injections, please keep the injection site dry and inspect the site for a couple of days. You may remove the Band-Aid the day of the injection at any time.     Some discomfort, bruising or slight swelling may occur at the injection site. This is not abnormal if it occurs.  If needed you may:    -Take over the counter medication such as Tylenol or Motrin.   -Apply an ice pack for 30 minutes, 2 to 3 times a day for the first 24 hours.     You may shower today; no soaking baths, hot tubs, whirlpools or swimming pools for two days.      If you are given steroids in your injection, it may take 3-5 days for the steroid medication to take effect. You may notice a worsening of your symptoms for 1-2 days after the injection. This is not abnormal.  You may use acetaminophen, ibuprofen, or prescription medication that your doctor may have prescribed for you if you need to do so.     A few common side effects of steroids include facial  flushing, sweating, restlessness, irritability,difficulty sleeping, increase in blood sugar, and increased blood pressure. If you have diabetes, please monitor your blood sugar at least once a day for at least 5 days. If you have poorly controlled high blood pressure, monitoryour blood pressure for at least 2 days and contact your primary care physician if these numbers are unusually high for you.      If you take aspirin or non-steroidal anti-inflammatory drugs (examples are Motrin, Advil, ibuprofen, Naprosyn, Voltaren, Relafen, etc.) you may restart these this evening, but stop taking it 3 days before your next appointment, unless instructed otherwiseby your physician.      You do not need to discontinue non-aspirin-containing pain medications prior to an injection (examples: Celebrex, tramadol, hydrocodone and acetaminophen).      If you take a blood thinning medication (Coumadin, Lovenox, Fragmin,Ticlid, Plavix, Pradaxa, etc.), please discuss this with your primary care physician/cardiologist and your pain physician. These medications MUST be discontinued before you can have an injection safely, without the risk of uncontrolled bleeding. If these medications are not discontinued for an appropriate period of time, you will not be able to receivean injection. Please adhere to instructions given to you about when to restart your blood thinning medication. If you have any questions please reach out to our team.    If you are taking Coumadin, please have your INR checked the morning of your procedure and bring the result to your appointment unless otherwise instructed. If your INR is over 1.2, your injection may need to be rescheduled to avoid uncontrolled bleeding from the needle placement.     Call [facility]  and ask for Pain Management at [phone] between 8am-4pm Monday - Friday if you are experiencing the following:    If you received an epidural or spinal injection:    -Headache that doesnot go away with  medicine, is worse when sitting or standing up, and is greatly relieved upon lying down.   -Severe pain worse than or different than your baseline pain.   -Chills or fever (101º F or greater).   -Drainage or signs of infection at the injection site     Go directly to the Emergency Department if you are experiencing the following and received an epidural or spinal injection:   -Abrupt weakness or progressive weakness in your legs that starts after you leave the clinic.   -Abrupt severe or worsening numbness in your legs.   -Inability to urinate after the injection or loss of bowel or bladder control without the urge to defecate or urinate.     If you have a clinical question that cannot wait until your next appointment, please call [phone] between 8am-4pm Monday - Friday or send a [employer] message. We do our best to return all non-emergency messages within 24 hours, Monday - Friday. A nurse or physician will return your message. You may also try calling and they will do their best to answer your question(s):  - Dr. Ivan Le's nurse ([phone])  - Dr. Aram Frey/Dr. Milton's nurse ([phone])  - Dr. Wojciech Burkett/Danilo's nurse ([phone])     If you need to cancel an appointment, please call the scheduling staff at [phone] during normal business hours or leave a message at least 24 hours in advance.     If you are going to be sedated for your next procedure, you MUST have responsible adult who can legally drive accompany you home. You cannot eat or drink for at least eight hours prior to the planned procedure if you are going to receive sedation. You may take your non-blood thinning medications with a small sip of water.

## 2024-12-30 ENCOUNTER — HOSPITAL ENCOUNTER (OUTPATIENT)
Dept: RADIOLOGY | Facility: CLINIC | Age: 54
Discharge: HOME | End: 2024-12-30
Payer: COMMERCIAL

## 2024-12-30 VITALS — BODY MASS INDEX: 34.55 KG/M2 | WEIGHT: 183 LBS | HEIGHT: 61 IN

## 2024-12-30 DIAGNOSIS — Z12.31 BREAST CANCER SCREENING BY MAMMOGRAM: ICD-10-CM

## 2024-12-30 PROCEDURE — 77067 SCR MAMMO BI INCL CAD: CPT

## 2024-12-30 PROCEDURE — 77063 BREAST TOMOSYNTHESIS BI: CPT | Performed by: RADIOLOGY

## 2024-12-30 PROCEDURE — 77067 SCR MAMMO BI INCL CAD: CPT | Performed by: RADIOLOGY

## 2025-01-14 ENCOUNTER — OFFICE VISIT (OUTPATIENT)
Dept: ORTHOPEDIC SURGERY | Facility: CLINIC | Age: 55
End: 2025-01-14
Payer: COMMERCIAL

## 2025-01-14 ENCOUNTER — DOCUMENTATION (OUTPATIENT)
Dept: ORTHOPEDIC SURGERY | Facility: HOSPITAL | Age: 55
End: 2025-01-14

## 2025-01-14 ENCOUNTER — HOSPITAL ENCOUNTER (OUTPATIENT)
Dept: RADIOLOGY | Facility: CLINIC | Age: 55
Discharge: HOME | End: 2025-01-14
Payer: COMMERCIAL

## 2025-01-14 DIAGNOSIS — M54.2 NECK PAIN: ICD-10-CM

## 2025-01-14 PROCEDURE — 99214 OFFICE O/P EST MOD 30 MIN: CPT | Performed by: ORTHOPAEDIC SURGERY

## 2025-01-14 PROCEDURE — 4010F ACE/ARB THERAPY RXD/TAKEN: CPT | Performed by: ORTHOPAEDIC SURGERY

## 2025-01-14 PROCEDURE — 99204 OFFICE O/P NEW MOD 45 MIN: CPT | Performed by: ORTHOPAEDIC SURGERY

## 2025-01-14 PROCEDURE — 1036F TOBACCO NON-USER: CPT | Performed by: ORTHOPAEDIC SURGERY

## 2025-01-14 NOTE — PROGRESS NOTES
Dear Fran,    Thank you for the opportunity of seeing Jody Joyce.    As you know, she has developed rather chronic low back, left hip and left leg pain.    She does have moderately severe spinal stenosis and a spondylolisthesis at L4-5.  I think this correlates with some of her symptoms.    However she also has quite a lot of hip pain despite not having terribly severe arthritic change.    As such I would have her continue to focus on a nonsurgical approach.  If her symptoms were to persist, certainly we could sit down and discuss the possibility of elective spine surgery.    Thanks again for the opportunity of seeing this pleasant woman.    Please don't hesitate to contact me at any time, via e-mail [email or web address] or cell phone [phone].      Sincerely,        Zeb Bennett M.D.  Chief, Spine Section  Professor & Kb Hernandez M.D. Endowed Chair   Department of Orthopedic Surgery  [place], [place]

## 2025-01-14 NOTE — PROGRESS NOTES
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    She has had longstanding issues with her low back and her left hip.    She has had 2 prior surgeries on her left hip to address labral pathology, in 2021 and 2022.  She relates that the surgery helped for short periods of time.    Currently she describes left groin and thigh pain, left lower paraspinal pain, and occasional buttock and posterior thigh pain.    It is difficult for her to stand and ambulate for prolonged period of time.  She uses a motorized scooter.    She has been through physical therapy and acupuncture.  She had a lumbar injection in December that provided transient relief.  She initially thought that the injection was going to be in the left sacroiliac joint where she relates a lot of her pain.    She is a diabetic.  A1c has been well-controlled at 6.2.    She does not smoke.    Family, social, and medical histories are obtained and reviewed.    30-point, patient-recorded Review of Systems is personally obtained and reviewed. Inclusive is no history of weight loss, change in appetite, recent change in activity level, change in bowel or bladder habits, fevers, chills, malaise, or night pain.    She is here today with her .    On exam micha middle-age woman moderately obese with a BMI of 34.    She has a very slow deliberate gait.  Somewhat antalgic secondary to left hip pain.  She has a moderate lumbar kyphosis.    She has limited active internal and external rotation of both hips.  She really cannot actively lift her leg up to tie her shoe.    Passive motion of both hips is relatively free and nonpainful.    Her strength is intact in the lower extremities without pathologic reflexes.    We have reviewed multiple studies from the past several years.    Plain films of both hips show minimal degenerative change although some dysplasia of her acetabulum.    Plain film of the lumbar spine from 2 years ago when she was  evaluated by Dr. Zacarias De La Rosa shows a grade 1 spondylolisthesis L4 on 5.  The L5-S1 disc space is somewhat degenerative.    Her lumbar MRI shows moderately severe stenosis at L4-5 and significant stenosis at the L5-S1 level as well.    Impression: This 54-year-old woman has symptoms both of low back issues and lumbar radiculopathy as well as some issues with her hips.    She appears to have become quite significantly deconditioned.  She is using a motorized scooter at a relatively young age.    She does not appear to have advanced degenerative arthritis of her hips that would warrant a total hip replacement.    The degree of stenosis that she has in her lumbar spine would warrant consideration of elective surgery.  I think however, it would be very difficult to predict just how effective the lumbar spine surgery would be, given the fact that she has a lot of pain related to the left hip region.    Surgery in her case would be a posterior decompression and instrumented fusion, perhaps from L4 to the sacrum.  This type of surgery would be indicated for relief of radicular symptoms primarily, and again, her symptoms are not classic for radiculopathy.    As such, I would have her focus on a nonsurgical approach over the next several months.  This would be especially true since she has had a very recent epidural steroid injection in the lumbar spine and she is a diabetic.  As such, elective spine surgery for at least 3 months would be contraindicated.    She is also seeing a hip specialist in the next several weeks which I think is a good idea.    She will keep me updated on her progress and I would be happy to see her again in the future.    ** Dictated with voice recognition software and not immediately reviewed for errors in grammar and/or spelling **

## 2025-01-17 ENCOUNTER — PREP FOR PROCEDURE (OUTPATIENT)
Dept: PAIN MEDICINE | Facility: HOSPITAL | Age: 55
End: 2025-01-17
Payer: COMMERCIAL

## 2025-01-17 DIAGNOSIS — M46.1 SACROILIITIS (CMS-HCC): ICD-10-CM

## 2025-01-30 ENCOUNTER — EVALUATION (OUTPATIENT)
Dept: PHYSICAL THERAPY | Facility: CLINIC | Age: 55
End: 2025-01-30
Payer: COMMERCIAL

## 2025-01-30 DIAGNOSIS — M48.061 SPINAL STENOSIS, LUMBAR REGION, WITHOUT NEUROGENIC CLAUDICATION: ICD-10-CM

## 2025-01-30 DIAGNOSIS — G89.29 CHRONIC LEFT-SIDED LOW BACK PAIN WITHOUT SCIATICA: Primary | ICD-10-CM

## 2025-01-30 DIAGNOSIS — M48.061 SPINAL STENOSIS, LUMBAR REGION WITHOUT NEUROGENIC CLAUDICATION: ICD-10-CM

## 2025-01-30 DIAGNOSIS — M25.552 LEFT HIP PAIN: ICD-10-CM

## 2025-01-30 DIAGNOSIS — M54.50 CHRONIC LEFT-SIDED LOW BACK PAIN WITHOUT SCIATICA: Primary | ICD-10-CM

## 2025-01-30 PROCEDURE — 97110 THERAPEUTIC EXERCISES: CPT | Mod: GP | Performed by: PHYSICAL THERAPIST

## 2025-01-30 PROCEDURE — 97161 PT EVAL LOW COMPLEX 20 MIN: CPT | Mod: GP | Performed by: PHYSICAL THERAPIST

## 2025-01-30 ASSESSMENT — ENCOUNTER SYMPTOMS
OCCASIONAL FEELINGS OF UNSTEADINESS: 0
LOSS OF SENSATION IN FEET: 0
DEPRESSION: 0

## 2025-01-30 NOTE — PROGRESS NOTES
Physical Therapy Evaluation    Patient Name: Jody Joyce  MRN: [id number]  Today's Date: 1/30/2025  Visit: 1  Referred by: Dr. Bennett   Time in:  5:00       Time out: 5:45  Diagnosis:   1. Chronic left-sided low back pain without sciatica        2. Left hip pain        3. Spinal stenosis, lumbar region, without neurogenic claudication        PRECAUTIONS:   none    SUBJECTIVE:  54 y.o. english speaking female with L) hip pain and chronic LBP.  Has had 2 labral repairs of the hip at [facility] '21 & '22.  Had partial relief of pain from second one.  Lateral burning hip pain. Constant.  5-7/10  Chronic LBP for 30+ years.  7-9/10.  Not sure how it started.  Has had several bouts of PT for LBP.  Currently acupuncture, chiro, massotherapy which help some what.  Has had a few series of blocks.  The most recent injection was 12/26/24.  Minimal relief.  In early December went to [place] for Education conference over a weekend and from the sitting there and in the car along with the bed she slept in the pain in her LB exacerbated markedly.    MRI 12-5-24: L3-4 disc bulge, mild canal stenosis, facet arthropathy, mild lateral recess and foraminal stenosis.  L4-5 disc bulge, severe R) mod. L) facet hypertrophy, mod canal stenosis severe bilat lateral recess narrowing and L5 NR impingement,   L5-S1 disc bulge severe bilat facet hypertrophy, severe canal stenosis, severe bilat. Lateral recess narrowing, S1 NR impingement, mod to severe bilat neuroforaminal narrowing.     Pain is worse with prolonged standing and walking, but any sustained posture will increase pain.    Better with TENS, heat, hot tub, cold, chiro, acupuncture, massage  Home Living:  Lives with her partner in their home.  Prior level of function:  Limited,  uses scooter to get around at work.   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  Personal factors that may impact care:  Chronic pain  OBJECTIVE:  Gait- uses cane, decreased step length on R)  " trunk rotated slightly to left  L) SLR (+) pain 50*  R) SLR (-) 75*  Give way weakness L) L for myotomal strength testing.  R) 5/5  Hip ABD and EXT 3+/5 L)  Hip flex 4/5  Abdominal strength 3-/5  (+) Lumbosacral active instability with active SLR test.  Trunk ROM is mod limited with flex and bilat SB, marked limitation with EXT.  All motions with pain during movement and worse at end ranges, but work with extension and R) SB.  Hip ROM L): ER- 50, IR 25, EXT- 5, flex- 90  All motions with pain during movement.  TTP throughout LBP and left lateral hip.  Mod to marked pain behaviors.  Outcome Measure:  Oswestry- 66%    ASSESSMENT:  Pt with chronic LB and left hip pain.  She has painful limited ROM of hip and back, decreased hip and core muscles, poor tolerances for ADLs.  She requires PT to address these issues.  Fair tolerance to exercises and exam today.    Problem list:  See above    Moderate complexity due to patient's clinical presentation being evolving with changing characteristics, with comorbidities/complexities to include LBP and hip pain, all of which may negatively impact rehab tolerance and progression.     Clinical presentation:  Stable and/or uncomplicated characteristics,     TREATMENT:  - Therex:  Abdominal brace  PPT 5\" x 10  HL ball ADD squeeze  5\" x 10  HL TB ABD  R  5\" x 10  Seated neural floss x 10 ea  Standing hip ABD, EXT, flex bilat.  X 10 ea.    PATIENT EDUCATION:  HEP    PLAN:   HEP every other day for first week then to daily.  Weekly PT x 8 weeks to progress towards PT goals.  Rehab potential: good  Plan of care agreement: Y    GOALS:  Active       PT Problem       Pt will have improved pain free ROM       Start:  01/30/25    Expected End:  04/11/25            Pt will have improved core muscle strength       Start:  01/30/25    Expected End:  04/11/25            Pt will have improved Oswestry score by at least 10%       Start:  01/30/25    Expected End:  04/11/25            Pt will be " independent with HEP       Start:  01/30/25    Expected End:  04/11/25            Pt will have improved functional tolerances with ADLs       Start:  01/30/25    Expected End:  04/11/25

## 2025-02-10 ENCOUNTER — TREATMENT (OUTPATIENT)
Dept: PHYSICAL THERAPY | Facility: CLINIC | Age: 55
End: 2025-02-10
Payer: COMMERCIAL

## 2025-02-10 ENCOUNTER — APPOINTMENT (OUTPATIENT)
Dept: PHYSICAL THERAPY | Facility: CLINIC | Age: 55
End: 2025-02-10
Payer: COMMERCIAL

## 2025-02-10 DIAGNOSIS — M54.50 CHRONIC LEFT-SIDED LOW BACK PAIN WITHOUT SCIATICA: Primary | ICD-10-CM

## 2025-02-10 DIAGNOSIS — M48.061 SPINAL STENOSIS, LUMBAR REGION, WITHOUT NEUROGENIC CLAUDICATION: ICD-10-CM

## 2025-02-10 DIAGNOSIS — G89.29 CHRONIC LEFT-SIDED LOW BACK PAIN WITHOUT SCIATICA: Primary | ICD-10-CM

## 2025-02-10 DIAGNOSIS — M25.552 LEFT HIP PAIN: ICD-10-CM

## 2025-02-10 PROCEDURE — 97110 THERAPEUTIC EXERCISES: CPT | Mod: GP | Performed by: PHYSICAL THERAPIST

## 2025-02-10 NOTE — PROGRESS NOTES
Physical Therapy Treatment    Patient Name: Jody Joyce  MRN: [id number]  Today's Date: 2/10/2025  Visit: 2  Referred by: Dr. Bennett   Time in:  9:15       Time out: 10:00  Diagnosis:   1. Chronic left-sided low back pain without sciatica        2. Left hip pain        3. Spinal stenosis, lumbar region, without neurogenic claudication        PRECAUTIONS:   none    SUBJECTIVE:  54 y.o. female with L) hip pain and chronic LBP.     L) LBP and lateral hip/thigh pain 8/10.  Lateral burning hip pain. Constant.     Has had 2 labral repairs of the hip at [facility] '21 & '22.   MRI 12-5-24: L3-4 disc bulge, mild canal stenosis, facet arthropathy, mild lateral recess and foraminal stenosis.  L4-5 disc bulge, severe R) mod. L) facet hypertrophy, mod canal stenosis severe bilat lateral recess narrowing and L5 NR impingement,   L5-S1 disc bulge severe bilat facet hypertrophy, severe canal stenosis, severe bilat. Lateral recess narrowing, S1 NR impingement, mod to severe bilat neuroforaminal narrowing.       OBJECTIVE:  Gait- uses cane, decreased step length on R)  trunk rotated slightly to left  L) SLR (+) pain 50*  R) SLR (-) 75*  Give way weakness L) L for myotomal strength testing.  R) 5/5  Hip ABD and EXT 3+/5 L)  Hip flex 4/5  Abdominal strength 3-/5  (+) Lumbosacral active instability with active SLR test.  Trunk ROM is mod limited with flex and bilat SB, marked limitation with EXT.  All motions with pain during movement and worse at end ranges, but work with extension and R) SB.  Hip ROM L): ER- 50, IR 25, EXT- 5, flex- 90  All motions with pain during movement.  TTP throughout LBP and left lateral hip.  Mod to marked pain behaviors.  Outcome Measure:  Oswestry- 66%    ASSESSMENT:  Pt with chronic LB and left hip pain.  She still has painful limited ROM of hip and back.  Tolerated today's session and reported on worsening  of pain at end of session.    TREATMENT:  - Therex:  Flex/ROT in R) SL  x  min  Abdominal  "brace  PPT 5\" x 10  HL ball ADD squeeze  5\" x 10 x 2  HL TB ABD  R  5\" x 10 x 2  Seated neural floss x 10 ea  Standing hip ABD, EXT, flex bilat.  2 X 10 ea.  Step touches 6\" 2x10  Recumbent bike L0  x 5min    PLAN:   HEP every other day for first week then to daily.  F/U next week and continue to progress with core strengthening and improving mobility as able.    GOALS:  Active       PT Problem       Pt will have improved pain free ROM       Start:  01/30/25    Expected End:  04/11/25            Pt will have improved core muscle strength       Start:  01/30/25    Expected End:  04/11/25            Pt will have improved Oswestry score by at least 10%       Start:  01/30/25    Expected End:  04/11/25            Pt will be independent with HEP       Start:  01/30/25    Expected End:  04/11/25            Pt will have improved functional tolerances with ADLs       Start:  01/30/25    Expected End:  04/11/25                "

## 2025-02-11 ENCOUNTER — APPOINTMENT (OUTPATIENT)
Dept: ORTHOPEDIC SURGERY | Facility: CLINIC | Age: 55
End: 2025-02-11
Payer: COMMERCIAL

## 2025-02-12 ENCOUNTER — APPOINTMENT (OUTPATIENT)
Dept: PHYSICAL THERAPY | Facility: CLINIC | Age: 55
End: 2025-02-12
Payer: COMMERCIAL

## 2025-02-17 ENCOUNTER — TREATMENT (OUTPATIENT)
Dept: PHYSICAL THERAPY | Facility: CLINIC | Age: 55
End: 2025-02-17
Payer: COMMERCIAL

## 2025-02-17 DIAGNOSIS — M25.552 LEFT HIP PAIN: ICD-10-CM

## 2025-02-17 DIAGNOSIS — M54.50 CHRONIC LEFT-SIDED LOW BACK PAIN WITHOUT SCIATICA: Primary | ICD-10-CM

## 2025-02-17 DIAGNOSIS — G89.29 CHRONIC LEFT-SIDED LOW BACK PAIN WITHOUT SCIATICA: Primary | ICD-10-CM

## 2025-02-17 DIAGNOSIS — M48.061 SPINAL STENOSIS, LUMBAR REGION, WITHOUT NEUROGENIC CLAUDICATION: ICD-10-CM

## 2025-02-17 PROCEDURE — 97535 SELF CARE MNGMENT TRAINING: CPT | Mod: GP

## 2025-02-17 PROCEDURE — 97140 MANUAL THERAPY 1/> REGIONS: CPT | Mod: GP

## 2025-02-17 NOTE — PROGRESS NOTES
"Physical Therapy Treatment    Patient Name: Jody Joyce  MRN: [id number]  Today's Date: 2/17/2025  Visit: 3  Referred by: Dr. Bennett    Time in 1015  Time out 1100    Diagnosis:   1. Chronic left-sided low back pain without sciatica        2. Left hip pain        3. Spinal stenosis, lumbar region, without neurogenic claudication          PRECAUTIONS:   Has had 2 labral repairs of the hip at [facility] '21 & '22.   MRI 12-5-24: L3-4 disc bulge, mild canal stenosis, facet arthropathy, mild lateral recess and foraminal stenosis.  L4-5 disc bulge, severe R) mod. L) facet hypertrophy, mod canal stenosis severe bilat lateral recess narrowing and L5 NR impingement,   L5-S1 disc bulge severe bilat facet hypertrophy, severe canal stenosis, severe bilat. Lateral recess narrowing, S1 NR impingement, mod to severe bilat neuroforaminal narrowing.       SUBJECTIVE:  Feels about the same. Left low back is killing me. Left worse than the right. Pain is different now after surgery but the pain never goes away.   Pain: 7-8/10 back 7/10 left hip   HEP: fair compliance       OBJECTIVE:  Off loading postures in standing and sitting away from Left side   Soft tissue restriction mayuri SI area.       ASSESSMENT:  Pt was receptive to ideas of PT today integrating PNE and pain behavior tools. Pt  was receptive to care. Pt educated in goals of PT being related to quality of life/activity. Pt given HEP that includes PMR, pacing, and feedback tools. Pt dee well.       TREATMENT:  - Therex: REVIEWED ONLY   Flex/ROT in R) SL  x  min  Abdominal brace  PPT 5\" x 10  HL ball ADD squeeze  5\" x 10 x 2  HL TB ABD  R  5\" x 10 x 2  Seated neural floss x 10 ea  Standing hip ABD, EXT, flex bilat.  2 X 10 ea.  Step touches 6\" 2x10  Recumbent bike L0  x 5min    Manual:  Right s/l STM low back  Gaps/glides PA glides gently     SELF MANAGEMENT:  Pain behavior recognition  PNE - educated in boom/bust cycles, pacing, output versus input, perceived " experience of pain  PMR HEP  Postural awareness   Activity modulation - activity scheduling pacing   Use of heat/cold safely   Focus - onto function off pain more onto building quality of life with baseline of pain 7/10    PLAN:   Progress integration of balance for strength of the hip and low back   Progress PNE     Billing = self x 2 man x 1     GOALS:  Active       PT Problem       Pt will have improved pain free ROM       Start:  01/30/25    Expected End:  04/11/25            Pt will have improved core muscle strength       Start:  01/30/25    Expected End:  04/11/25            Pt will have improved Oswestry score by at least 10%       Start:  01/30/25    Expected End:  04/11/25            Pt will be independent with HEP       Start:  01/30/25    Expected End:  04/11/25            Pt will have improved functional tolerances with ADLs       Start:  01/30/25    Expected End:  04/11/25

## 2025-02-19 ENCOUNTER — APPOINTMENT (OUTPATIENT)
Dept: PHYSICAL THERAPY | Facility: CLINIC | Age: 55
End: 2025-02-19
Payer: COMMERCIAL

## 2025-03-06 ENCOUNTER — APPOINTMENT (OUTPATIENT)
Dept: PHYSICAL THERAPY | Facility: CLINIC | Age: 55
End: 2025-03-06
Payer: COMMERCIAL

## 2025-03-12 ENCOUNTER — TREATMENT (OUTPATIENT)
Dept: PHYSICAL THERAPY | Facility: CLINIC | Age: 55
End: 2025-03-12
Payer: COMMERCIAL

## 2025-03-12 DIAGNOSIS — M25.552 LEFT HIP PAIN: ICD-10-CM

## 2025-03-12 DIAGNOSIS — G89.29 CHRONIC LEFT-SIDED LOW BACK PAIN WITHOUT SCIATICA: Primary | ICD-10-CM

## 2025-03-12 DIAGNOSIS — M48.061 SPINAL STENOSIS, LUMBAR REGION, WITHOUT NEUROGENIC CLAUDICATION: ICD-10-CM

## 2025-03-12 DIAGNOSIS — M54.50 CHRONIC LEFT-SIDED LOW BACK PAIN WITHOUT SCIATICA: Primary | ICD-10-CM

## 2025-03-12 PROCEDURE — 97110 THERAPEUTIC EXERCISES: CPT | Mod: GP

## 2025-03-12 NOTE — PROGRESS NOTES
Physical Therapy Treatment    Patient Name: Jody Joyce  MRN: [id number]  Today's Date: 3/13/2025  Visit: 4  Referred by: Dr. Bennett    Time in 1015  Time out 1100    Diagnosis:   1. Chronic left-sided low back pain without sciatica  Follow Up In Physical Therapy      2. Left hip pain  Follow Up In Physical Therapy      3. Spinal stenosis, lumbar region, without neurogenic claudication  Follow Up In Physical Therapy        PRECAUTIONS:   Has had 2 labral repairs of the hip at [facility] '21 & '22.   MRI 12-5-24: L3-4 disc bulge, mild canal stenosis, facet arthropathy, mild lateral recess and foraminal stenosis.  L4-5 disc bulge, severe R) mod. L) facet hypertrophy, mod canal stenosis severe bilat lateral recess narrowing and L5 NR impingement,   L5-S1 disc bulge severe bilat facet hypertrophy, severe canal stenosis, severe bilat. Lateral recess narrowing, S1 NR impingement, mod to severe bilat neuroforaminal narrowing.       SUBJECTIVE:  Close to the same. Muscle relaxation exercise was difficult for her to do.     Pain: 7-8/10 back 7/10 left hip   HEP: fair compliance       OBJECTIVE:  Cues for breathing with exercise = positive response  Addressing unknown tension postures = positive response       TREATMENT:    Nustep level 3 7 min.   Stair stretches - hip flex, hamstring, gastroc 3 x 20 sec mayuri     Postural isometrics 3 sec x 6:  Elbow extension  Palms  Back of hands    Supine x 15 with coordinated breathing:  PT   Adduction hip in hook lye   GS in hook lye   Supine hip hike   PT with shoulder flexion/extension         SELF MANAGEMENT:  Pain behavior recognition  PNE - educated in boom/bust cycles, pacing, output versus input, perceived experience of pain  PMR HEP  Postural awareness   Activity modulation - activity scheduling pacing   Use of heat/cold safely   Focus - onto function off pain more onto building quality of life with baseline of pain 7/10    ASSESSMENT:  Pt cued for breathing. Pt cued to  reduce facial grimace and tension posturing prior to movement. Pt with predicting/anticipation of pain with all movement.   Focused on isometric exercise to encourage activation without movement to reduce pain and improve response to PT. Quality of movement and isometric focus while cueing patient to reduce accessory muscle tightness/activation. Pt was fatigued end of session yet pain level did not increase.       PLAN:   Progress integration of balance for strength of the hip and low back   Progress PNE     Billing: Therex x 3

## 2025-03-18 ENCOUNTER — TREATMENT (OUTPATIENT)
Dept: PHYSICAL THERAPY | Facility: CLINIC | Age: 55
End: 2025-03-18
Payer: COMMERCIAL

## 2025-03-18 DIAGNOSIS — M48.061 SPINAL STENOSIS, LUMBAR REGION, WITHOUT NEUROGENIC CLAUDICATION: ICD-10-CM

## 2025-03-18 DIAGNOSIS — G89.29 CHRONIC LEFT-SIDED LOW BACK PAIN WITHOUT SCIATICA: ICD-10-CM

## 2025-03-18 DIAGNOSIS — M25.552 LEFT HIP PAIN: ICD-10-CM

## 2025-03-18 DIAGNOSIS — M54.50 CHRONIC LEFT-SIDED LOW BACK PAIN WITHOUT SCIATICA: ICD-10-CM

## 2025-03-18 PROCEDURE — 97110 THERAPEUTIC EXERCISES: CPT | Mod: GP

## 2025-03-18 NOTE — PROGRESS NOTES
Physical Therapy Treatment    Patient Name: Jody Joyce  MRN: [id number]  Today's Date: 3/18/2025  Visit: 5  Referred by: Dr. Bennett    Time in 1015  Time out 1100    Diagnosis:   1. Chronic left-sided low back pain without sciatica  Follow Up In Physical Therapy      2. Left hip pain  Follow Up In Physical Therapy      3. Spinal stenosis, lumbar region, without neurogenic claudication  Follow Up In Physical Therapy        PRECAUTIONS:   Has had 2 labral repairs of the hip at [facility] '21 & '22.   MRI 12-5-24: L3-4 disc bulge, mild canal stenosis, facet arthropathy, mild lateral recess and foraminal stenosis.  L4-5 disc bulge, severe R) mod. L) facet hypertrophy, mod canal stenosis severe bilat lateral recess narrowing and L5 NR impingement,   L5-S1 disc bulge severe bilat facet hypertrophy, severe canal stenosis, severe bilat. Lateral recess narrowing, S1 NR impingement, mod to severe bilat neuroforaminal narrowing.       SUBJECTIVE:      Pain: 7-8/10 back 7/10 left hip   HEP: fair compliance       OBJECTIVE:  Cues for breathing with exercise = positive response  Addressing unknown tension postures = positive response       TREATMENT:    Nustep level 3 10 min.   Stair stretches - hip flex, hamstring, gastroc 3 x 20 sec mayuri     Postural isometrics 3 sec x 6:  Elbow extension  Palms  Back of hands    Supine x 15 with coordinated breathing:  PT   Adduction hip in hook lye   GS in hook lye  Supine hip hike   PT with shoulder flexion/extension   Mini abduction GTB hook lye   In supine - leg extensors iso hold 3 sec x 10 mayuri     SELF MANAGEMENT:  Pain behavior recognition  PNE - educated in boom/bust cycles, pacing, output versus input, perceived experience of pain  PMR HEP  Postural awareness   Activity modulation - activity scheduling pacing   Use of heat/cold safely   Focus - onto function off pain more onto building quality of life with baseline of pain 7/10    ASSESSMENT:  Pt dee well with ability to increase  bike time and isometrics. Pt with cues for breathing as well as reduced facial grimacing and anticipatory pain behaviors. Pt with good progression of activity today. Conversational and did well. Rested prior to leaving. Pt responding well to paced graded exercise. Focused on return more normal IADL activity.     PLAN:   Progress integration of balance for strength of the hip and low back   Progress PNE     Billing: Therex x 3

## 2025-03-19 ENCOUNTER — APPOINTMENT (OUTPATIENT)
Dept: PHYSICAL THERAPY | Facility: CLINIC | Age: 55
End: 2025-03-19
Payer: COMMERCIAL

## 2025-03-24 ENCOUNTER — APPOINTMENT (OUTPATIENT)
Dept: ENDOCRINOLOGY | Facility: CLINIC | Age: 55
End: 2025-03-24
Payer: COMMERCIAL

## 2025-03-24 VITALS
DIASTOLIC BLOOD PRESSURE: 78 MMHG | HEIGHT: 61 IN | SYSTOLIC BLOOD PRESSURE: 122 MMHG | WEIGHT: 181 LBS | BODY MASS INDEX: 34.17 KG/M2

## 2025-03-24 DIAGNOSIS — E11.29 MICROALBUMINURIA DUE TO TYPE 2 DIABETES MELLITUS (MULTI): ICD-10-CM

## 2025-03-24 DIAGNOSIS — E78.00 HYPERCHOLESTEREMIA: ICD-10-CM

## 2025-03-24 DIAGNOSIS — R80.9 MICROALBUMINURIA DUE TO TYPE 2 DIABETES MELLITUS (MULTI): ICD-10-CM

## 2025-03-24 DIAGNOSIS — I10 BENIGN ESSENTIAL HYPERTENSION: ICD-10-CM

## 2025-03-24 DIAGNOSIS — E11.8 CONTROLLED DIABETES MELLITUS TYPE 2 WITH COMPLICATIONS, UNSPECIFIED WHETHER LONG TERM INSULIN USE (MULTI): Primary | ICD-10-CM

## 2025-03-24 LAB — POC HEMOGLOBIN A1C: 6.7 % (ref 4.2–6.5)

## 2025-03-24 PROCEDURE — 99204 OFFICE O/P NEW MOD 45 MIN: CPT | Performed by: INTERNAL MEDICINE

## 2025-03-24 PROCEDURE — 4010F ACE/ARB THERAPY RXD/TAKEN: CPT | Performed by: INTERNAL MEDICINE

## 2025-03-24 PROCEDURE — 3078F DIAST BP <80 MM HG: CPT | Performed by: INTERNAL MEDICINE

## 2025-03-24 PROCEDURE — 3074F SYST BP LT 130 MM HG: CPT | Performed by: INTERNAL MEDICINE

## 2025-03-24 PROCEDURE — 83036 HEMOGLOBIN GLYCOSYLATED A1C: CPT | Performed by: INTERNAL MEDICINE

## 2025-03-24 PROCEDURE — 3008F BODY MASS INDEX DOCD: CPT | Performed by: INTERNAL MEDICINE

## 2025-03-24 NOTE — PROGRESS NOTES
"Patient ID: Jody Joyce \"Edilia" is a 55 y.o. female who presents for New Patient Visit (Endocrine consult. Referred by  for her diabetes.).  HPI  The patient is referred for evaluation of type 2 diabetes on insulin.    This is a 55-year-old female whose had diabetes for approximately last 15 years.    It is complicated by nephropathy.    She is currently taking Lantus 32 units and NovoLog 8 to 10 units 30 minutes before meals.    She is also on Farxiga 10 mg.    She had a hemoglobin A1c in December that was 6.2% down from 8%.    In the past she was on glipizide metformin and Ozempic.    There were issues with gastric emptying she had a gastric emptying study that suggested gastroparesis but repeated that was normal.    When she stopped Ozempic which she believes was 2 mg her insulin requirements went up significantly.    Over the last year or so she has significantly tightened up her carbohydrate intake.    She has gained approximately 25 pounds since going off the Ozempic and the metformin.    She is using the freestyle juve 3 and her 90-day average 140 time in target 87% GMI 6.7%.    She gets occasional low blood sugars.    She has a past history of hypertension hyperlipidemia tremor migraines hip surgery edema.    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    Family history positive for diabetes in her mother and sister.    ROS  Comprehensive review of systems is negative.    Objective   Physical Exam  Visit Vitals  /78      Vitals:    03/24/25 1018   Weight: 82.1 kg (181 lb)      Body mass index is 34.2 kg/m².      Alert and oriented x3  In no distress  No focal neurologic deficits  No supraclavicular or dorsal fat  No purple striae  Integument intact    ENT normal. No adenopathy  Fundi normal  Thyroid palpable and normal. No nodules  Chest clear to auscultation  Heart sounds are normal  Abdomen nontender. Bowel sounds normal. No organomegaly  Feet are " "okay  Normal vibration and monofilament sensation normal pulses, no lesions    Current Outpatient Medications   Medication Sig Dispense Refill    acetaminophen (Tylenol) 325 mg tablet Take 2 tablets (650 mg) by mouth every 6 hours if needed for mild pain (1 - 3).      atorvastatin (Lipitor) 40 mg tablet Take 1 tablet (40 mg) by mouth once daily at bedtime.      BD Ultra-Fine Mini Pen Needle 31 gauge x 3/16\" needle       blood sugar diagnostic strip CHECK BLOOD SUGARS 4 TIMES DAILY,  ICD-10: E11.9      dapagliflozin propanediol (Farxiga) 10 mg Take 1 tablet (10 mg) by mouth once daily in the morning.      estradiol (Estrace) 0.01 % (0.1 mg/gram) vaginal cream 1 g in applicator nightly.  Disp # 3,  42.5 gm tubes (90Days)  Refill # 42.5 g 3    famotidine (Pepcid) 40 mg tablet Take 1 tablet (40 mg) by mouth 2 times a day. 180 tablet 1    FreeStyle Jose 3 Sensor device USE AS DIRECTED TO MONITOR BLOOD GLUCOSE      Lantus Solostar U-100 Insulin 100 unit/mL (3 mL) pen Inject 15 units daily in the morning      levETIRAcetam (Keppra) 1,000 mg tablet       losartan (Cozaar) 50 mg tablet Take 0.5 tablets (25 mg) by mouth once daily.      metoprolol succinate XL (Toprol-XL) 100 mg 24 hr tablet       NovoLOG Flexpen U-100 Insulin 100 unit/mL (3 mL) pen INJECT 4 UNITS SUBCUTANEOUSLY 3 TIMES A DAY WITH MEALS PLUS SLIDING SCALE #1. TOTAL DAILY DOSE 30 UNITS      ondansetron ODT (Zofran-ODT) 4 mg disintegrating tablet Dissolve 1 tablet (4 mg) in the mouth 3 times a day as needed.      oxybutynin XL (Ditropan-XL) 10 mg 24 hr tablet Oxybutynin Chloride ER 10 MG Oral Tablet Extended Release 24 Hour   Quantity: 90  Refills: 0        Start : 27-Sep-2021   Active      rizatriptan MLT (Maxalt-MLT) 10 mg disintegrating tablet Dissolve 1 tablet (10 mg) in the mouth.      venlafaxine XR (Effexor-XR) 150 mg 24 hr capsule Take 1 capsule (150 mg) by mouth once daily.       No current facility-administered medications for this visit. "       Assessment/Plan     1.  Type 2 diabetes on insulin  2.  Hypertension  3.  Hyperlipidemia  4.  Renal insufficiency    We discussed the pathophysiology of diabetes, insulin resistance and insulin insufficiency. We discussed risks for complications, goals for treatment, as well as options for treatment.    We discussed basal and bolus requirements.    We discussed insulin physiology.    I have advised that she take the mealtime insulin right as she sits down to eat.    We discussed adjustments.    We discussed the impact of diet.    We discussed the impact of exercise.    Will check hemoglobin A1c by fingerstick today.    Will add in Ozempic starting at 0.25 mg and working up from there if tolerated.    Will consider adding back in metformin at a lower dose as an option down the road.    She will continue with the freestyle juve 3 and make adjustments as warranted.    She will follow-up with me in 3 months sooner as needed.

## 2025-03-25 ENCOUNTER — TREATMENT (OUTPATIENT)
Dept: PHYSICAL THERAPY | Facility: CLINIC | Age: 55
End: 2025-03-25
Payer: COMMERCIAL

## 2025-03-25 DIAGNOSIS — M54.50 CHRONIC LEFT-SIDED LOW BACK PAIN WITHOUT SCIATICA: ICD-10-CM

## 2025-03-25 DIAGNOSIS — G89.29 CHRONIC LEFT-SIDED LOW BACK PAIN WITHOUT SCIATICA: ICD-10-CM

## 2025-03-25 DIAGNOSIS — M48.061 SPINAL STENOSIS, LUMBAR REGION, WITHOUT NEUROGENIC CLAUDICATION: ICD-10-CM

## 2025-03-25 DIAGNOSIS — M25.552 LEFT HIP PAIN: ICD-10-CM

## 2025-03-25 PROCEDURE — 97110 THERAPEUTIC EXERCISES: CPT | Mod: GP

## 2025-03-25 NOTE — PROGRESS NOTES
Physical Therapy Treatment    Patient Name: Jody Joyce  MRN: [id number]  Today's Date: 3/25/2025  Visit: 6  Referred by: Dr. Bennett    730am - 815am     Diagnosis:   1. Chronic left-sided low back pain without sciatica  Follow Up In Physical Therapy      2. Left hip pain  Follow Up In Physical Therapy      3. Spinal stenosis, lumbar region, without neurogenic claudication  Follow Up In Physical Therapy        PRECAUTIONS:   Has had 2 labral repairs of the hip at [facility] '21 & '22.   MRI 12-5-24: L3-4 disc bulge, mild canal stenosis, facet arthropathy, mild lateral recess and foraminal stenosis.  L4-5 disc bulge, severe R) mod. L) facet hypertrophy, mod canal stenosis severe bilat lateral recess narrowing and L5 NR impingement,   L5-S1 disc bulge severe bilat facet hypertrophy, severe canal stenosis, severe bilat. Lateral recess narrowing, S1 NR impingement, mod to severe bilat neuroforaminal narrowing.       SUBJECTIVE:  Tight and stiff. Sore. Has recently been sick - stomach virus     Pain: 7-8/10 back 7/10 left hip   HEP: fair compliance       OBJECTIVE:  Cues for breathing with exercise = positive response  Addressing unknown tension postures = positive response   Engaging conversation = distraction = increased activity     TREATMENT:    Nustep level 3 10 min.   Stair stretches - hip flex, hamstring, gastroc 3 x 20 sec mayuri     Postural isometrics 3 sec x 6: HEP   Elbow extension  Palms  Back of hands    Air ex x 15 mayuri:  Weight shift AP, lateral   CR  Step taps 10 inch      Supine x 20 with coordinated breathing:  PT   Adduction hip in hook lye   GS in hook lye  Supine hip hike   PT with shoulder flexion/extension   Mini abduction GTB hook lye   postural extensors iso hold 3 sec x 10 mayuri     SELF MANAGEMENT:  Pain behavior recognition  PNE - educated in boom/bust cycles, pacing, output versus input, perceived experience of pain  PMR HEP  Postural awareness   Activity modulation - activity scheduling  pacing   Use of heat/cold safely   Focus - onto function off pain more onto building quality of life with baseline of pain 7/10    ASSESSMENT:  Pt able to dee increased CKC exercise today. Distraction of conversation assisted in the progression of activity without the focus on pain. Focused remained on engaging activity. Pt cued for reps and breathing. Overall, dee well with increased activity level. Pt demonstrates improved ability to dee exercise and activity.       PLAN:   Progress integration of balance for strength of the hip and low back   Progress PNE     Billing: Therex x 3

## 2025-03-25 NOTE — H&P
"HISTORY AND PHYSICAL UPDATE FOR PROCEDURE    History Of Present Illness  Jody Joyce \"Edilia" is a 55 y.o. female presenting with bialteral buttock pain d/t sacroiliitis.  Here for Left sacroiliac joint injection, possible    This note is intended to be an update to the H&P from the last office consult note. Please refer to the last pain management consult note for full H&P.    she denies any recent antibiotic use or infections, she denies any blood thinner use , and she denies contrast or local anesthetic allergies     Pre-sedation evaluation:  ASA Classification (bolded):   ASA I: Healthy patient, non-smoking, no none or minimal alcohol use  ASA II: Patient with mild systemic disease, without substantiative functional limitations.  Current smoker, social alcohol drinker, pregnancy, obesity (BMI 30-40)DM/HTN,, well-controlled mild lung disease  ASA III: Patient with severe systemic disease; substantiative of functional limitation; One or more moderate to severe diseases: Poorly controlled DM/HTN, COPD, morbid obesity (BMI>40), active hepatitis, alcohol abuse/dependence, implanted pacemaker, moderate reduction of ejection fraction, ESRD on dialysis, history (>3months) of MI, CVA, TIA or CAD/stents  ASA IV: Patient with severe systemic disease that is a constant threat to life; recent (<3 months) MI, CVA, TIA or CAD/stents, ongoing cardiac ischemia or severe valvular dysfunction, severely reduced ejection fraction, shock, sepsis, DIC, ESRD not undergoing regular scheduled dialysis    Mallampati score (bolded):   Class I: Complete visualization of the soft palate  Class II: Complete visualization of the uvula  Class III: Visualization of only the base of the uvula  Class IV: Soft palate is not visible at all    Past Medical History  Past Medical History:   Diagnosis Date    Chronic pain disorder 1/1/2000    COVID-19     Diverticulosis of large intestine without perforation or abscess without bleeding     " Diverticulosis of large intestine without hemorrhage    Encounter for gynecological examination (general) (routine) without abnormal findings 08/29/2022    Encounter for well woman exam with routine gynecological exam    Endometriosis 1/1/1990    Fractures 4/1/1991    Joint pain 1/1/1995    Low back pain 1/1/1989    Migraine 1/1/1990    Personal history of other diseases of the female genital tract     History of dysfunctional uterine bleeding    Personal history of other diseases of the nervous system and sense organs     History of migraine    Personal history of other diseases of the respiratory system 04/29/2016    History of acute sinusitis    Personal history of other medical treatment 08/29/2022    History of screening mammography    Personal history of urinary calculi     History of renal calculi    Trochanteric bursitis, right hip     Greater trochanteric bursitis of both hips       Surgical History  Past Surgical History:   Procedure Laterality Date    BREAST BIOPSY      LAPAROSCOPY DIAGNOSTIC / BIOPSY / ASPIRATION / LYSIS  04/11/2016    Laparoscopy (Diagnostic)    TRIGGER POINT INJECTION  1/1/1996        Social History  She reports that she has never smoked. She has never been exposed to tobacco smoke. She has never used smokeless tobacco. She reports current alcohol use. She reports that she does not use drugs.    Family History  Family History   Family history unknown: Yes        Allergies  Oxycodone-acetaminophen, Sutures, and Amoxicillin-pot clavulanate    Review of Systems   12 point ROS done and negative except for the above.   Physical Exam     General: NAD, well groomed, well nourished  Eyes: Non-icteric sclera, EOMI  Ears, Nose, Mouth, and Throat: External ears and nose appear to be without deformity or rash. No lesions or masses noted. Hearing is grossly intact.   Neck: Trachea midline  Respiratory: Nonlabored breathing   Cardiovascular: No peripheral edema   Skin: No rashes or open  "lesions/ulcers identified on skin.    Last Recorded Vitals  not currently breastfeeding.    Relevant Results  Current Outpatient Medications   Medication Instructions    acetaminophen (TYLENOL) 650 mg, Every 6 hours PRN    atorvastatin (LIPITOR) 40 mg, Nightly    BD Ultra-Fine Mini Pen Needle 31 gauge x 3/16\" needle     blood sugar diagnostic strip CHECK BLOOD SUGARS 4 TIMES DAILY,  ICD-10: E11.9    dapagliflozin propanediol (Farxiga) 10 mg 1 tablet, Every morning    estradiol (Estrace) 0.01 % (0.1 mg/gram) vaginal cream 1 g in applicator nightly.  Disp # 3,  42.5 gm tubes (90Days)  Refill #    famotidine (PEPCID) 40 mg, oral, 2 times daily    FreeStyle Jose 3 Sensor device USE AS DIRECTED TO MONITOR BLOOD GLUCOSE    Lantus Solostar U-100 Insulin 100 unit/mL (3 mL) pen Inject 15 units daily in the morning    levETIRAcetam (Keppra) 1,000 mg tablet     losartan (COZAAR) 25 mg, Daily    metoprolol succinate XL (Toprol-XL) 100 mg 24 hr tablet     NovoLOG Flexpen U-100 Insulin 100 unit/mL (3 mL) pen INJECT 4 UNITS SUBCUTANEOUSLY 3 TIMES A DAY WITH MEALS PLUS SLIDING SCALE #1. TOTAL DAILY DOSE 30 UNITS    ondansetron ODT (ZOFRAN-ODT) 4 mg, 3 times daily PRN    oxybutynin XL (Ditropan-XL) 10 mg 24 hr tablet Oxybutynin Chloride ER 10 MG Oral Tablet Extended Release 24 Hour   Quantity: 90  Refills: 0        Start : 27-Sep-2021   Active    rizatriptan MLT (MAXALT-MLT) 10 mg    semaglutide 0.25 mg, subcutaneous, Every 7 days, Inject 0.25 mg weekly for 4 weeks then increase to 0.5 mg.    venlafaxine XR (Effexor-XR) 150 mg 24 hr capsule 1 capsule, Daily      Lab Results   Component Value Date    WBC 8.0 07/06/2021    HGB 13.1 07/06/2021    HCT 39.3 07/06/2021    MCV 93 07/06/2021     07/06/2021      No results found for: \"INR\", \"PROTIME\"  No results found for: \"PTT\"  Lab Results   Component Value Date    GLUCOSE 228 (H) 07/06/2021    CALCIUM 9.8 07/06/2021     07/06/2021    K 3.1 (L) 07/06/2021    CO2 18 (L) " 07/06/2021    CL 98 07/06/2021    BUN 34 (H) 07/06/2021    CREATININE 2.28 (H) 07/06/2021       === 12/09/24 ===    MR LUMBAR SPINE WO CONTRAST       Assessment/Plan   Jody Joyce is a 55 y.o. F who presents for LEFT sacroiliac joint injection.     Risks, benefits, alternatives discussed. All questions answered to the best of my ability. Patient agrees to proceed. Consent signed and patient marked appropriately.    -We will proceed with planned procedure  -Discussed with the patient that once appropriate from a recovery standpoint, they should continue physical therapy exercises at least 2-3 times per week for best long term outcomes  - discussed with the patient that if they take blood thinners, they may resume them in 24hrs        Robert Qureshi MD  Interventional Pain Fellow, PGY-5  [facility]

## 2025-03-27 ENCOUNTER — HOSPITAL ENCOUNTER (OUTPATIENT)
Facility: HOSPITAL | Age: 55
Discharge: HOME | End: 2025-03-27
Payer: COMMERCIAL

## 2025-03-27 VITALS
RESPIRATION RATE: 16 BRPM | OXYGEN SATURATION: 99 % | HEART RATE: 70 BPM | SYSTOLIC BLOOD PRESSURE: 146 MMHG | DIASTOLIC BLOOD PRESSURE: 87 MMHG | TEMPERATURE: 97.7 F

## 2025-03-27 DIAGNOSIS — M46.1 SACROILIITIS (CMS-HCC): ICD-10-CM

## 2025-03-27 PROCEDURE — 27096 INJECT SACROILIAC JOINT: CPT | Mod: 50 | Performed by: PAIN MEDICINE

## 2025-03-27 PROCEDURE — 2550000001 HC RX 255 CONTRASTS: Performed by: PAIN MEDICINE

## 2025-03-27 PROCEDURE — 27096 INJECT SACROILIAC JOINT: CPT | Performed by: PAIN MEDICINE

## 2025-03-27 PROCEDURE — 2500000004 HC RX 250 GENERAL PHARMACY W/ HCPCS (ALT 636 FOR OP/ED): Mod: JW | Performed by: PAIN MEDICINE

## 2025-03-27 RX ORDER — METHYLPREDNISOLONE ACETATE 40 MG/ML
INJECTION, SUSPENSION INTRA-ARTICULAR; INTRALESIONAL; INTRAMUSCULAR; SOFT TISSUE
Status: COMPLETED | OUTPATIENT
Start: 2025-03-27 | End: 2025-03-27

## 2025-03-27 RX ORDER — LIDOCAINE HYDROCHLORIDE 5 MG/ML
INJECTION, SOLUTION INFILTRATION; INTRAVENOUS
Status: COMPLETED | OUTPATIENT
Start: 2025-03-27 | End: 2025-03-27

## 2025-03-27 RX ADMIN — METHYLPREDNISOLONE ACETATE 60 MG: 40 INJECTION, SUSPENSION INTRA-ARTICULAR; INTRALESIONAL; INTRAMUSCULAR; SOFT TISSUE at 08:55

## 2025-03-27 RX ADMIN — IOHEXOL 0.5 ML: 240 INJECTION, SOLUTION INTRATHECAL; INTRAVASCULAR; INTRAVENOUS; ORAL at 08:55

## 2025-03-27 RX ADMIN — LIDOCAINE HYDROCHLORIDE 8 ML: 5 INJECTION, SOLUTION INFILTRATION at 08:54

## 2025-03-27 ASSESSMENT — PAIN - FUNCTIONAL ASSESSMENT
PAIN_FUNCTIONAL_ASSESSMENT: WONG-BAKER FACES
PAIN_FUNCTIONAL_ASSESSMENT: WONG-BAKER FACES
PAIN_FUNCTIONAL_ASSESSMENT: 0-10
PAIN_FUNCTIONAL_ASSESSMENT: 0-10

## 2025-03-27 ASSESSMENT — COLUMBIA-SUICIDE SEVERITY RATING SCALE - C-SSRS
6. HAVE YOU EVER DONE ANYTHING, STARTED TO DO ANYTHING, OR PREPARED TO DO ANYTHING TO END YOUR LIFE?: NO
1. IN THE PAST MONTH, HAVE YOU WISHED YOU WERE DEAD OR WISHED YOU COULD GO TO SLEEP AND NOT WAKE UP?: NO
2. HAVE YOU ACTUALLY HAD ANY THOUGHTS OF KILLING YOURSELF?: NO

## 2025-03-27 ASSESSMENT — PAIN SCALES - GENERAL
PAINLEVEL_OUTOF10: 5 - MODERATE PAIN
PAINLEVEL_OUTOF10: 8
PAINLEVEL_OUTOF10: 10 - WORST POSSIBLE PAIN
PAINLEVEL_OUTOF10: 5 - MODERATE PAIN

## 2025-03-27 ASSESSMENT — PAIN DESCRIPTION - DESCRIPTORS: DESCRIPTORS: STABBING

## 2025-03-27 NOTE — DISCHARGE INSTRUCTIONS
DISCHARGE INSTRUCTIONS FOR INJECTIONS     You underwent right sacroiliac joint injection today    After most injections, it is recommended that you relax and limit your activity for the remainder of the day unless you have been told otherwise by your pain physician.  You should not drive a car, operate machinery, or make important legal decisions unless otherwise directed by your pain physician.  You may resume your normal activity, including exercise, tomorrow.      Keep a written pain diary of how much pain relief you experienced following the injection procedure and the length of time of pain relief you experienced pain relief. Following diagnostic injections like medial branch nerve blocks, sacroiliac joint blocks, stellate ganglion injections and other blocks, it is very important you record the specific amount of pain relief you experienced immediately after the injectionand how long it lasted. Your doctor will ask you for this information at your follow up visit.     For all injections, please keep the injection site dry and inspect the site for a couple of days. You may remove the Band-Aid the day of the injection at any time.     Some discomfort, bruising or slight swelling may occur at the injection site. This is not abnormal if it occurs.  If needed you may:    -Take over the counter medication such as Tylenol or Motrin.   -Apply an ice pack for 30 minutes, 2 to 3 times a day for the first 24 hours.     You may shower today; no soaking baths, hot tubs, whirlpools or swimming pools for two days.      If you are given steroids in your injection, it may take 3-5 days for the steroid medication to take effect. You may notice a worsening of your symptoms for 1-2 days after the injection. This is not abnormal.  You may use acetaminophen, ibuprofen, or prescription medication that your doctor may have prescribed for you if you need to do so.     A few common side effects of steroids include facial flushing,  sweating, restlessness, irritability,difficulty sleeping, increase in blood sugar, and increased blood pressure. If you have diabetes, please monitor your blood sugar at least once a day for at least 5 days. If you have poorly controlled high blood pressure, monitoryour blood pressure for at least 2 days and contact your primary care physician if these numbers are unusually high for you.      If you take aspirin or non-steroidal anti-inflammatory drugs (examples are Motrin, Advil, ibuprofen, Naprosyn, Voltaren, Relafen, etc.) you may restart these this evening, but stop taking it 3 days before your next appointment, unless instructed otherwiseby your physician.      You do not need to discontinue non-aspirin-containing pain medications prior to an injection (examples: Celebrex, tramadol, hydrocodone and acetaminophen).      If you take a blood thinning medication (Coumadin, Lovenox, Fragmin,Ticlid, Plavix, Pradaxa, etc.), please discuss this with your primary care physician/cardiologist and your pain physician. These medications MUST be discontinued before you can have an injection safely, without the risk of uncontrolled bleeding. If these medications are not discontinued for an appropriate period of time, you will not be able to receivean injection. Please adhere to instructions given to you about when to restart your blood thinning medication. If you have any questions please reach out to our team.    If you are taking Coumadin, please have your INR checked the morning of your procedure and bring the result to your appointment unless otherwise instructed. If your INR is over 1.2, your injection may need to be rescheduled to avoid uncontrolled bleeding from the needle placement.     Call [facility]  and ask for Pain Management at [phone] between 8am-4pm Monday - Friday if you are experiencing the following:    If you received an epidural or spinal injection:    -Headache that doesnot go away with medicine, is  worse when sitting or standing up, and is greatly relieved upon lying down.   -Severe pain worse than or different than your baseline pain.   -Chills or fever (101º F or greater).   -Drainage or signs of infection at the injection site     Go directly to the Emergency Department if you are experiencing the following and received an epidural or spinal injection:   -Abrupt weakness or progressive weakness in your legs that starts after you leave the clinic.   -Abrupt severe or worsening numbness in your legs.   -Inability to urinate after the injection or loss of bowel or bladder control without the urge to defecate or urinate.     If you have a clinical question that cannot wait until your next appointment, please call [phone] between 8am-4pm Monday - Friday or send a swabr message. We do our best to return all non-emergency messages within 24 hours, Monday - Friday. A nurse or physician will return your message. You may also try calling and they will do their best to answer your question(s):  - Dr. Ivan Le's nurse ([phone])  - Dr. Aram Frey/Dr. Milton's nurse ([phone])  - Dr. Wojciech Burkett/Danilo's nurse ([phone])     If you need to cancel an appointment, please call the scheduling staff at [phone] during normal business hours or leave a message at least 24 hours in advance.     If you are going to be sedated for your next procedure, you MUST have responsible adult who can legally drive accompany you home. You cannot eat or drink for at least eight hours prior to the planned procedure if you are going to receive sedation. You may take your non-blood thinning medications with a small sip of water.

## 2025-04-09 ENCOUNTER — TREATMENT (OUTPATIENT)
Dept: PHYSICAL THERAPY | Facility: CLINIC | Age: 55
End: 2025-04-09
Payer: COMMERCIAL

## 2025-04-09 DIAGNOSIS — M54.50 CHRONIC LEFT-SIDED LOW BACK PAIN WITHOUT SCIATICA: ICD-10-CM

## 2025-04-09 DIAGNOSIS — M48.061 SPINAL STENOSIS, LUMBAR REGION, WITHOUT NEUROGENIC CLAUDICATION: ICD-10-CM

## 2025-04-09 DIAGNOSIS — G89.29 CHRONIC LEFT-SIDED LOW BACK PAIN WITHOUT SCIATICA: ICD-10-CM

## 2025-04-09 DIAGNOSIS — M25.552 LEFT HIP PAIN: ICD-10-CM

## 2025-04-09 PROCEDURE — 97110 THERAPEUTIC EXERCISES: CPT | Mod: GP

## 2025-04-09 NOTE — PROGRESS NOTES
Physical Therapy Treatment    Patient Name: Jody Joyce  MRN: [id number]  Today's Date: 4/9/2025  Visit: 7  Referred by: Dr. Bennett    730am - 815am     Diagnosis:   1. Chronic left-sided low back pain without sciatica  Follow Up In Physical Therapy      2. Left hip pain  Follow Up In Physical Therapy      3. Spinal stenosis, lumbar region, without neurogenic claudication  Follow Up In Physical Therapy        PRECAUTIONS:   Has had 2 labral repairs of the hip at [facility] '21 & '22.   MRI 12-5-24: L3-4 disc bulge, mild canal stenosis, facet arthropathy, mild lateral recess and foraminal stenosis.  L4-5 disc bulge, severe R) mod. L) facet hypertrophy, mod canal stenosis severe bilat lateral recess narrowing and L5 NR impingement,   L5-S1 disc bulge severe bilat facet hypertrophy, severe canal stenosis, severe bilat. Lateral recess narrowing, S1 NR impingement, mod to severe bilat neuroforaminal narrowing.       SUBJECTIVE:      Pain: 7-8/10 back 7/10 left hip   HEP: fair compliance       OBJECTIVE:  Cues for breathing with exercise = positive response  Addressing unknown tension postures = positive response   Engaging conversation = distraction = increased activity     TREATMENT:    Nustep level 3 10 min.   Stair stretches - hip flex, hamstring, gastroc 3 x 20 sec mayuri     Postural isometrics 3 sec x 6: HEP   Elbow extension  Palms  Back of hands    Air ex x 15 mayuri:  Weight shift AP, lateral   CR  Step taps 10 inch      Supine x 20 with coordinated breathing:  PT   Adduction hip in hook lye   GS in hook lye  Supine hip hike   PT with shoulder flexion/extension   Mini abduction GTB hook lye   postural extensors iso hold 3 sec x 10 mayuri   Modified dead bug movement     SELF MANAGEMENT:  Pain behavior recognition  PNE - educated in boom/bust cycles, pacing, output versus input, perceived experience of pain  PMR HEP  Postural awareness   Activity modulation - activity scheduling pacing   Use of heat/cold safely    Focus - onto function off pain more onto building quality of life with baseline of pain 7/10    ASSESSMENT:  Pt with good dee of mobility and core strength exercises. Pt does well with interaction and distraction. NBOS dee well with cues for breathing. Pt does have tendency to hold her breathe during exercise. Pt dee well.       PLAN:   Progress integration of balance for strength of the hip and low back   Progress PNE     Billing: Therex x 3

## 2025-04-14 ENCOUNTER — TREATMENT (OUTPATIENT)
Dept: PHYSICAL THERAPY | Facility: CLINIC | Age: 55
End: 2025-04-14
Payer: COMMERCIAL

## 2025-04-14 DIAGNOSIS — M54.50 CHRONIC LEFT-SIDED LOW BACK PAIN WITHOUT SCIATICA: ICD-10-CM

## 2025-04-14 DIAGNOSIS — M48.061 SPINAL STENOSIS, LUMBAR REGION, WITHOUT NEUROGENIC CLAUDICATION: ICD-10-CM

## 2025-04-14 DIAGNOSIS — G89.29 CHRONIC LEFT-SIDED LOW BACK PAIN WITHOUT SCIATICA: ICD-10-CM

## 2025-04-14 DIAGNOSIS — M25.552 LEFT HIP PAIN: ICD-10-CM

## 2025-04-14 PROCEDURE — 97110 THERAPEUTIC EXERCISES: CPT | Mod: GP

## 2025-04-14 NOTE — PROGRESS NOTES
Physical Therapy Treatment    Patient Name: Jody Joyce  MRN: [id number]  Today's Date: 4/14/2025  Visit: 8  Referred by: Dr. Bennett    730am - 815am     Diagnosis:   1. Chronic left-sided low back pain without sciatica  Follow Up In Physical Therapy      2. Left hip pain  Follow Up In Physical Therapy      3. Spinal stenosis, lumbar region, without neurogenic claudication  Follow Up In Physical Therapy        PRECAUTIONS:   Has had 2 labral repairs of the hip at [facility] '21 & '22.   MRI 12-5-24: L3-4 disc bulge, mild canal stenosis, facet arthropathy, mild lateral recess and foraminal stenosis.  L4-5 disc bulge, severe R) mod. L) facet hypertrophy, mod canal stenosis severe bilat lateral recess narrowing and L5 NR impingement,   L5-S1 disc bulge severe bilat facet hypertrophy, severe canal stenosis, severe bilat. Lateral recess narrowing, S1 NR impingement, mod to severe bilat neuroforaminal narrowing.       SUBJECTIVE:  Work was ok. Tired     Pain: 7-8/10 back 7/10 left hip   HEP: fair compliance       OBJECTIVE:  Cues for breathing with exercise = positive response  Addressing unknown tension postures = positive response   Engaging conversation = distraction = increased activity     TREATMENT:    Nustep level 3 10 min.   Stair stretches - hip flex, hamstring, gastroc 3 x 20 sec mayuri     Postural isometrics 3 sec x 6: HEP   Elbow extension  Palms  Back of hands    Air ex x 15 mayuri:  Weight shift AP, lateral   CR  Step taps 10 inch  Mini movement 3 way hip mayuri     Supine x 20 with coordinated breathing:  PT   Adduction hip in hook lye   GS in hook lye  Supine hip hike   PT with shoulder flexion/extension   Mini abduction GTB hook lye   postural extensors iso hold 3 sec x 10 mayuri   Modified dead bug movement     SELF MANAGEMENT:  Pain behavior recognition  PNE - educated in boom/bust cycles, pacing, output versus input, perceived experience of pain  PMR HEP  Postural awareness   Activity modulation - activity  scheduling pacing   Use of heat/cold safely   Focus - onto function off pain more onto building quality of life with baseline of pain 7/10    ASSESSMENT:  Pt with good dee of progression of dynamic strength activity. Pt able to improve her balance activity with minimal to no UE assist. Pt is with fatigue and soreness end of session. Great effort. Challenged.     PLAN:   Progress integration of balance for strength of the hip and low back   Progress PNE     Billing: Therex x 3

## 2025-04-21 ENCOUNTER — TREATMENT (OUTPATIENT)
Dept: PHYSICAL THERAPY | Facility: CLINIC | Age: 55
End: 2025-04-21
Payer: COMMERCIAL

## 2025-04-21 DIAGNOSIS — M54.50 CHRONIC LEFT-SIDED LOW BACK PAIN WITHOUT SCIATICA: ICD-10-CM

## 2025-04-21 DIAGNOSIS — G89.29 CHRONIC LEFT-SIDED LOW BACK PAIN WITHOUT SCIATICA: ICD-10-CM

## 2025-04-21 DIAGNOSIS — M25.552 LEFT HIP PAIN: ICD-10-CM

## 2025-04-21 DIAGNOSIS — M48.061 SPINAL STENOSIS, LUMBAR REGION, WITHOUT NEUROGENIC CLAUDICATION: ICD-10-CM

## 2025-04-21 PROCEDURE — 97110 THERAPEUTIC EXERCISES: CPT | Mod: GP

## 2025-04-21 NOTE — PROGRESS NOTES
Physical Therapy Treatment    Patient Name: Jody Joyce  MRN: [id number]  Today's Date: 4/21/2025  Visit: 9  Referred by: Dr. Bennett      Diagnosis:   1. Chronic left-sided low back pain without sciatica  Follow Up In Physical Therapy      2. Left hip pain  Follow Up In Physical Therapy      3. Spinal stenosis, lumbar region, without neurogenic claudication  Follow Up In Physical Therapy        PRECAUTIONS:   Has had 2 labral repairs of the hip at [facility] '21 & '22.   MRI 12-5-24: L3-4 disc bulge, mild canal stenosis, facet arthropathy, mild lateral recess and foraminal stenosis.  L4-5 disc bulge, severe R) mod. L) facet hypertrophy, mod canal stenosis severe bilat lateral recess narrowing and L5 NR impingement,   L5-S1 disc bulge severe bilat facet hypertrophy, severe canal stenosis, severe bilat. Lateral recess narrowing, S1 NR impingement, mod to severe bilat neuroforaminal narrowing.       SUBJECTIVE:  Ok, worked today. Had a good Holiday.     Pain: 7-8/10 back 7/10 left hip   HEP: fair compliance       OBJECTIVE:        TREATMENT:    Nustep level 3 10 min.   Stair stretches - hip flex, hamstring, gastroc 3 x 20 sec mayuri     Air ex x 15 mayuri:  Weight shift AP, lateral   CR  Step taps 10 inch  Mini movement 3 way hip mayuri     Dynamic stepping over cones lateral, AP, stagger stance  Dynamic LE reaches to targets in PNF planes      Supine x 20 with coordinated breathing:  PT   Adduction hip in hook lye   GS in hook lye  Supine hip hike   PT with shoulder flexion/extension   Mini abduction GTB hook lye   postural extensors iso hold 3 sec x 10 mayuri   Modified dead bug movement     SELF MANAGEMENT:  Pain behavior recognition  PNE - educated in boom/bust cycles, pacing, output versus input, perceived experience of pain  PMR HEP  Postural awareness   Activity modulation - activity scheduling pacing   Use of heat/cold safely   Focus - onto function off pain more onto building quality of life with baseline of pain  7/10    ASSESSMENT:  Able to dee progression well with no reports of pain. Was fatigued, with current exercise program. Pt with improved dee of dynamic activity and CKC exercise. Progressing in dee of DLS and balance activity. CP given on left hip and mayuri low back prior to leaving PT. Pt dee well. Paced exercise with rest as needed.       PLAN:   Progress integration of balance for strength of the hip and low back   Progress PNE     Billing: Therex x 3   [phone]

## 2025-04-28 ENCOUNTER — TREATMENT (OUTPATIENT)
Dept: PHYSICAL THERAPY | Facility: CLINIC | Age: 55
End: 2025-04-28
Payer: COMMERCIAL

## 2025-04-28 DIAGNOSIS — M25.552 LEFT HIP PAIN: ICD-10-CM

## 2025-04-28 DIAGNOSIS — M48.061 SPINAL STENOSIS, LUMBAR REGION, WITHOUT NEUROGENIC CLAUDICATION: ICD-10-CM

## 2025-04-28 DIAGNOSIS — M54.50 CHRONIC LEFT-SIDED LOW BACK PAIN WITHOUT SCIATICA: ICD-10-CM

## 2025-04-28 DIAGNOSIS — G89.29 CHRONIC LEFT-SIDED LOW BACK PAIN WITHOUT SCIATICA: ICD-10-CM

## 2025-04-28 PROCEDURE — 97110 THERAPEUTIC EXERCISES: CPT | Mod: GP

## 2025-04-28 NOTE — PROGRESS NOTES
Physical Therapy Treatment    Patient Name: Jody Joyce  MRN: [id number]  Today's Date: 4/28/2025  Visit: 10  Referred by: Dr. Bennett      Diagnosis:   1. Chronic left-sided low back pain without sciatica  Follow Up In Physical Therapy      2. Left hip pain  Follow Up In Physical Therapy      3. Spinal stenosis, lumbar region, without neurogenic claudication  Follow Up In Physical Therapy        PRECAUTIONS:   Has had 2 labral repairs of the hip at [facility] '21 & '22.   MRI 12-5-24: L3-4 disc bulge, mild canal stenosis, facet arthropathy, mild lateral recess and foraminal stenosis.  L4-5 disc bulge, severe R) mod. L) facet hypertrophy, mod canal stenosis severe bilat lateral recess narrowing and L5 NR impingement,   L5-S1 disc bulge severe bilat facet hypertrophy, severe canal stenosis, severe bilat. Lateral recess narrowing, S1 NR impingement, mod to severe bilat neuroforaminal narrowing.       SUBJECTIVE:  Work was OK. Feels OK. Tired.     Pain: 7-8/10 back 7/10 left hip   HEP: fair compliance       OBJECTIVE:  Attempted use of TG reduced body weight mini squats - unable due to thigh weakness/discomfort. Completed 2 reps       TREATMENT:    Nustep level 3 10 min.   Stair stretches - hip flex, hamstring, gastroc 3 x 20 sec mayuri   TG stop at 6 - unable to dee    Air ex x 15 mayuri:  Weight shift AP, lateral   CR  Step taps 10 inch  Mini movement 3 way hip amyuri     Dynamic stepping over cones lateral, AP, stagger stance  Dynamic LE reaches to targets in PNF planes  NBOS 60 sec x 3 mayuri - eye movement, arm movement, static holds     Supine x 20 with coordinated breathing:  PT   Adduction hip in hook lye   GS in hook lye  Supine hip hike   PT with shoulder flexion/extension   Mini abduction GTB hook lye   Modified dead bug movement     SELF MANAGEMENT:  Pain behavior recognition  PNE - educated in boom/bust cycles, pacing, output versus input, perceived experience of pain  PMR HEP  Postural awareness   Activity  modulation - activity scheduling pacing   Use of heat/cold safely   Focus - onto function off pain more onto building quality of life with baseline of pain 7/10    ASSESSMENT:  Fatigued end of session. Able to dee more dynamic LE strengthening for hip abductors. Able to dee stepping and LE patterns for strength. Modified to her tolerance. Small ROM. Pt complained of muscle fatigue. Rest provided as needed. Unable to dee TG in reduced body weight. Relief with rest and CP use. Improving.       PLAN:   Progress integration of balance for strength of the hip and low back   Progress PNE     Billing: Therex x 3   [phone]

## 2025-05-05 ENCOUNTER — TREATMENT (OUTPATIENT)
Dept: PHYSICAL THERAPY | Facility: CLINIC | Age: 55
End: 2025-05-05
Payer: COMMERCIAL

## 2025-05-05 DIAGNOSIS — M48.061 SPINAL STENOSIS, LUMBAR REGION, WITHOUT NEUROGENIC CLAUDICATION: ICD-10-CM

## 2025-05-05 DIAGNOSIS — M54.50 CHRONIC LEFT-SIDED LOW BACK PAIN WITHOUT SCIATICA: ICD-10-CM

## 2025-05-05 DIAGNOSIS — M25.552 LEFT HIP PAIN: ICD-10-CM

## 2025-05-05 DIAGNOSIS — G89.29 CHRONIC LEFT-SIDED LOW BACK PAIN WITHOUT SCIATICA: ICD-10-CM

## 2025-05-05 PROCEDURE — 97110 THERAPEUTIC EXERCISES: CPT | Mod: GP

## 2025-05-05 NOTE — PROGRESS NOTES
Physical Therapy Treatment    Patient Name: Jody Joyce  MRN: [id number]  Today's Date: 5/5/2025  Visit: 11  Referred by: Dr. Bennett      Diagnosis:   1. Chronic left-sided low back pain without sciatica  Follow Up In Physical Therapy      2. Left hip pain  Follow Up In Physical Therapy      3. Spinal stenosis, lumbar region, without neurogenic claudication  Follow Up In Physical Therapy        PRECAUTIONS:   Has had 2 labral repairs of the hip at [facility] '21 & '22.   MRI 12-5-24: L3-4 disc bulge, mild canal stenosis, facet arthropathy, mild lateral recess and foraminal stenosis.  L4-5 disc bulge, severe R) mod. L) facet hypertrophy, mod canal stenosis severe bilat lateral recess narrowing and L5 NR impingement,   L5-S1 disc bulge severe bilat facet hypertrophy, severe canal stenosis, severe bilat. Lateral recess narrowing, S1 NR impingement, mod to severe bilat neuroforaminal narrowing.       SUBJECTIVE:  Feeling OK. Came from work. Sore at times. Has left hip pain and pain down the left quad area. Seeking non surgical help.     Pain: 7-8/10 back 7/10 left hip   HEP: fair compliance       OBJECTIVE:        TREATMENT:    Nustep level 3 10 min.   Stair stretches - hip flex, hamstring, gastroc 3 x 20 sec mayuri   TG stop at 6 - unable to dee    Air ex x 15 mayuri:  Weight shift AP, lateral   CR  Step taps 10 inch  Mini movement 3 way hip mayuri     Step up 4 inch x 12 mayuri   Step lateral tap x 12 mayuri     Dynamic stepping over cones lateral, AP, stagger stance  Dynamic LE reaches to targets in PNF planes  NBOS 60 sec x 3 mayuri - eye movement, arm movement, static holds     Supine x 20 with coordinated breathing:  PT   Adduction hip in hook lye   GS in hook lye  Supine hip hike       SELF MANAGEMENT:  Pain behavior recognition  PNE - educated in boom/bust cycles, pacing, output versus input, perceived experience of pain  PMR HEP  Postural awareness   Activity modulation - activity scheduling pacing   Use of heat/cold safely    Focus - onto function off pain more onto building quality of life with baseline of pain 7/10    ASSESSMENT:  Limited by fatigue as session progressed. Did well with current treatment program based on strength and balance. Pt had fatigue with WB and NBOS activity. Pt able to stretch post PT with mild relief. CP provided. Pt educated in strength gains taking time 4-6 weeks to see the improvement. Overall, Progressing well.       PLAN:   Progress integration of balance for strength of the hip and low back   Progress PNE     Billing: Therex x 3   [phone]

## 2025-05-12 ENCOUNTER — TREATMENT (OUTPATIENT)
Dept: PHYSICAL THERAPY | Facility: CLINIC | Age: 55
End: 2025-05-12
Payer: COMMERCIAL

## 2025-05-12 DIAGNOSIS — M54.50 CHRONIC LEFT-SIDED LOW BACK PAIN WITHOUT SCIATICA: ICD-10-CM

## 2025-05-12 DIAGNOSIS — M25.552 LEFT HIP PAIN: ICD-10-CM

## 2025-05-12 DIAGNOSIS — M48.061 SPINAL STENOSIS, LUMBAR REGION, WITHOUT NEUROGENIC CLAUDICATION: ICD-10-CM

## 2025-05-12 DIAGNOSIS — G89.29 CHRONIC LEFT-SIDED LOW BACK PAIN WITHOUT SCIATICA: ICD-10-CM

## 2025-05-12 PROCEDURE — 97110 THERAPEUTIC EXERCISES: CPT | Mod: GP

## 2025-05-12 NOTE — PROGRESS NOTES
Physical Therapy Treatment    Patient Name: Jody Joyce  MRN: [id number]  Today's Date: 5/12/2025  Visit: 12  Referred by: Dr. Bennett      Diagnosis:   1. Chronic left-sided low back pain without sciatica  Follow Up In Physical Therapy      2. Left hip pain  Follow Up In Physical Therapy      3. Spinal stenosis, lumbar region, without neurogenic claudication  Follow Up In Physical Therapy        PRECAUTIONS:   Has had 2 labral repairs of the hip at [facility] '21 & '22.   MRI 12-5-24: L3-4 disc bulge, mild canal stenosis, facet arthropathy, mild lateral recess and foraminal stenosis.  L4-5 disc bulge, severe R) mod. L) facet hypertrophy, mod canal stenosis severe bilat lateral recess narrowing and L5 NR impingement,   L5-S1 disc bulge severe bilat facet hypertrophy, severe canal stenosis, severe bilat. Lateral recess narrowing, S1 NR impingement, mod to severe bilat neuroforaminal narrowing.       SUBJECTIVE:  Attempting to go see Dr. Mason for non surgical follow up. Feeling OK.     Pain: 7-8/10 back 7/10 left hip   HEP: fair compliance       OBJECTIVE:  No UE assist with dynamic balance       TREATMENT:    Nustep level 3 10 min.   Stair stretches - hip flex, hamstring, gastroc 3 x 20 sec mayuri   TG stop at 6 - unable to dee    Air ex x 15 mayuri:  Weight shift AP, lateral   CR  Step taps 10 inch  Mini movement 3 way hip mayuri     Step up 4 inch x 12 mayuri   Step lateral tap x 12 mayuri     Dynamic stepping over cones lateral, AP, stagger stance  Dynamic LE reaches to targets in PNF planes  NBOS 60 sec x 3 mayuri - eye movement, arm movement, static holds   Modified SLS 30 sec x 4 mayuri     Supine x 20 with coordinated breathing:  PT   Adduction hip in hook lye   GS in hook lye  Supine hip hike       SELF MANAGEMENT:  Pain behavior recognition  PNE - educated in boom/bust cycles, pacing, output versus input, perceived experience of pain  PMR HEP  Postural awareness   Activity modulation - activity scheduling pacing   Use of  heat/cold safely   Focus - onto function off pain more onto building quality of life with baseline of pain 7/10    ASSESSMENT:  Pt with improved dee of balance activity today with no UE assist. Able to increase time on the air ex as well as time completing activity prior to needing a break. Dee well. Fatigued end of session. Has left low back SI area pain. Soft tissue restriction noted around the SI area. Pt with good dee of DLS today.       PLAN:   Progress integration of balance for strength of the hip and low back   Progress PNE     Billing: Therex x 3   [phone]

## 2025-05-20 ENCOUNTER — TREATMENT (OUTPATIENT)
Dept: PHYSICAL THERAPY | Facility: CLINIC | Age: 55
End: 2025-05-20
Payer: COMMERCIAL

## 2025-05-20 DIAGNOSIS — M48.061 SPINAL STENOSIS, LUMBAR REGION, WITHOUT NEUROGENIC CLAUDICATION: ICD-10-CM

## 2025-05-20 DIAGNOSIS — G89.29 CHRONIC LEFT-SIDED LOW BACK PAIN WITHOUT SCIATICA: ICD-10-CM

## 2025-05-20 DIAGNOSIS — M54.50 CHRONIC LEFT-SIDED LOW BACK PAIN WITHOUT SCIATICA: ICD-10-CM

## 2025-05-20 DIAGNOSIS — M25.552 LEFT HIP PAIN: ICD-10-CM

## 2025-05-20 PROCEDURE — 97110 THERAPEUTIC EXERCISES: CPT | Mod: GP

## 2025-05-20 NOTE — PROGRESS NOTES
Physical Therapy Treatment    Patient Name: Jody Joyce  MRN: [id number]  Today's Date: 5/20/2025  Visit: 13  Referred by: Dr. Bennett      Diagnosis:   1. Chronic left-sided low back pain without sciatica  Follow Up In Physical Therapy      2. Left hip pain  Follow Up In Physical Therapy      3. Spinal stenosis, lumbar region, without neurogenic claudication  Follow Up In Physical Therapy        PRECAUTIONS:   Has had 2 labral repairs of the hip at [facility] '21 & '22.   MRI 12-5-24: L3-4 disc bulge, mild canal stenosis, facet arthropathy, mild lateral recess and foraminal stenosis.  L4-5 disc bulge, severe R) mod. L) facet hypertrophy, mod canal stenosis severe bilat lateral recess narrowing and L5 NR impingement,   L5-S1 disc bulge severe bilat facet hypertrophy, severe canal stenosis, severe bilat. Lateral recess narrowing, S1 NR impingement, mod to severe bilat neuroforaminal narrowing.       SUBJECTIVE:  Mornings are tougher, sore, and moving more slow.     Pain: 7-8/10 back 7/10 left hip   HEP: fair compliance       OBJECTIVE:  WB hip AROM limited today, painful, unable to complete     TREATMENT:    Nustep level 3 10 min.   Stair stretches - hip flex, hamstring, gastroc 3 x 20 sec mayuri   TG stop at 6 - unable to dee    Air ex x 20 mayuri: modified as progressed   Weight shift AP, lateral   CR  Step taps 10 inch  Mini movement 3 way hip mayuri     Step up 4 inch x 12 mayuri   Step lateral tap x 12 mayuri     HELD dynamic balance 5/20/25  Dynamic stepping over cones lateral, AP, stagger stance  Dynamic LE reaches to targets in PNF planes  NBOS 60 sec x 3 mayuri - eye movement, arm movement, static holds   Modified SLS 30 sec x 4 mayuri     Supine x 20 with coordinated breathing:  PT   Adduction hip in hook lye   GS in hook lye  Supine hip hike   Modified dead bug  AA SLR  AA abduction   SAQ 1#       SELF MANAGEMENT:  Pain behavior recognition  PNE - educated in boom/bust cycles, pacing, output versus input, perceived  experience of pain  PMR HEP  Postural awareness   Activity modulation - activity scheduling pacing   Use of heat/cold safely   Focus - onto function off pain more onto building quality of life with baseline of pain 7/10    ASSESSMENT:  Modified activity to her tolerance. Increased pain with WB exercise, unable to continue so exercise deferred to supine. Able to participate better in NWB. Pt able to perform AAROM well. Pt had relief with use of CP on low back post PT. Pt challenged today. Modified to symptoms as needed. Cued for focus on mobility. Pt may perform better in the evening when she his more mobile.       PLAN:   Progress integration of balance for strength of the hip and low back   Progress PNE     Billing: Therex x 3   [phone]

## 2025-06-04 ENCOUNTER — APPOINTMENT (OUTPATIENT)
Dept: PHYSICAL THERAPY | Facility: CLINIC | Age: 55
End: 2025-06-04
Payer: COMMERCIAL

## 2025-06-05 ENCOUNTER — TREATMENT (OUTPATIENT)
Dept: PHYSICAL THERAPY | Facility: CLINIC | Age: 55
End: 2025-06-05
Payer: COMMERCIAL

## 2025-06-05 DIAGNOSIS — M48.061 SPINAL STENOSIS, LUMBAR REGION, WITHOUT NEUROGENIC CLAUDICATION: ICD-10-CM

## 2025-06-05 DIAGNOSIS — G89.29 CHRONIC LEFT-SIDED LOW BACK PAIN WITHOUT SCIATICA: ICD-10-CM

## 2025-06-05 DIAGNOSIS — M54.50 CHRONIC LEFT-SIDED LOW BACK PAIN WITHOUT SCIATICA: ICD-10-CM

## 2025-06-05 DIAGNOSIS — M25.552 LEFT HIP PAIN: ICD-10-CM

## 2025-06-05 PROCEDURE — 97110 THERAPEUTIC EXERCISES: CPT | Mod: GP

## 2025-06-05 NOTE — PROGRESS NOTES
Physical Therapy Treatment    Patient Name: Jody Joyce  MRN: [id number]  Today's Date: 6/5/2025  Visit: 14  Referred by: Dr. Bennett      Diagnosis:   1. Chronic left-sided low back pain without sciatica  Follow Up In Physical Therapy      2. Left hip pain  Follow Up In Physical Therapy      3. Spinal stenosis, lumbar region, without neurogenic claudication  Follow Up In Physical Therapy        PRECAUTIONS:   Has had 2 labral repairs of the hip at [facility] '21 & '22.   MRI 12-5-24: L3-4 disc bulge, mild canal stenosis, facet arthropathy, mild lateral recess and foraminal stenosis.  L4-5 disc bulge, severe R) mod. L) facet hypertrophy, mod canal stenosis severe bilat lateral recess narrowing and L5 NR impingement,   L5-S1 disc bulge severe bilat facet hypertrophy, severe canal stenosis, severe bilat. Lateral recess narrowing, S1 NR impingement, mod to severe bilat neuroforaminal narrowing.       SUBJECTIVE:  Out of school. Hips hurt, back is ok. Legs are very sore.     Pain: 6-8/10  HEP: fair compliance       OBJECTIVE:  Fair dee of [facility] exercise     TREATMENT:    Nustep level 3 10 min.   Stair stretches - hip flex, hamstring, gastroc 3 x 20 sec mayuri   TG stop at 6 - unable to dee    Air ex x 20 mayuri: modified as progressed   Weight shift AP, lateral   CR  Step taps 10 inch  Mini movement 3 way hip mayuri   Hip hike     Step up 4 inch x 12 mayuri       HELD dynamic balance 6/5/25  Dynamic stepping over cones lateral, AP, stagger stance  Dynamic LE reaches to targets in PNF planes  NBOS 60 sec x 3 mayuri - eye movement, arm movement, static holds   Modified SLS 30 sec x 4 mayuri     Supine x 20 with coordinated breathing:  PT   Abduction yellow TB supine Clamshell   GS with bridge   Supine hip hike   Modified dead bug  AA SLR  AA abduction   SAQ 1.5#       SELF MANAGEMENT:  Pain behavior recognition  PNE - educated in boom/bust cycles, pacing, output versus input, perceived experience of pain  PMR HEP  Postural awareness    Activity modulation - activity scheduling pacing   Use of heat/cold safely   Focus - onto function off pain more onto building quality of life with baseline of pain 7/10    ASSESSMENT:  PT dee well. Pt with improved dee of CKC exercise today. Pt able to improve participation despite soreness. Pt progressing in DLS and hip strength activity. Challenged. Relief with CP. Pt responding well to focus on strength.     PLAN:   Progress integration of balance for strength of the hip and low back   Progress PNE     Billing: Therex x 3   [phone]

## 2025-06-09 ENCOUNTER — TREATMENT (OUTPATIENT)
Dept: PHYSICAL THERAPY | Facility: CLINIC | Age: 55
End: 2025-06-09
Payer: COMMERCIAL

## 2025-06-09 DIAGNOSIS — M54.50 CHRONIC LEFT-SIDED LOW BACK PAIN WITHOUT SCIATICA: Primary | ICD-10-CM

## 2025-06-09 DIAGNOSIS — M25.552 LEFT HIP PAIN: ICD-10-CM

## 2025-06-09 DIAGNOSIS — M48.061 SPINAL STENOSIS, LUMBAR REGION, WITHOUT NEUROGENIC CLAUDICATION: ICD-10-CM

## 2025-06-09 DIAGNOSIS — G89.29 CHRONIC LEFT-SIDED LOW BACK PAIN WITHOUT SCIATICA: Primary | ICD-10-CM

## 2025-06-09 PROCEDURE — 97110 THERAPEUTIC EXERCISES: CPT | Mod: GP

## 2025-06-09 NOTE — PROGRESS NOTES
"Physical Therapy Treatment    Patient Name: Jody Joyce  MRN: [id number]  Today's Date: 6/9/2025  Visit: 15  Referred by: Dr. Bennett      Diagnosis:   1. Chronic left-sided low back pain without sciatica  Follow Up In Physical Therapy      2. Left hip pain  Follow Up In Physical Therapy      3. Spinal stenosis, lumbar region, without neurogenic claudication  Follow Up In Physical Therapy        PRECAUTIONS:   Has had 2 labral repairs of the hip at [facility] '21 & '22.   MRI 12-5-24: L3-4 disc bulge, mild canal stenosis, facet arthropathy, mild lateral recess and foraminal stenosis.  L4-5 disc bulge, severe R) mod. L) facet hypertrophy, mod canal stenosis severe bilat lateral recess narrowing and L5 NR impingement,   L5-S1 disc bulge severe bilat facet hypertrophy, severe canal stenosis, severe bilat. Lateral recess narrowing, S1 NR impingement, mod to severe bilat neuroforaminal narrowing.       SUBJECTIVE:  Back and hips are \"killing\" her.     Pain: 6-8/10  HEP: fair compliance       OBJECTIVE:      TREATMENT:    Nustep level 3 10 min.   Stair stretches - hip flex, hamstring, gastroc 3 x 20 sec mayuri       Air ex x 20 mayuri: modified as progressed   Weight shift AP, lateral   CR  Step taps 10 inch  Mini movement 3 way hip mayuri   Hip hike     Step up 4 inch x 12 mayuri     SLS and sh romberg activity 5 min.     Supine x 20 with coordinated breathing:  PT   Abduction yellow TB supine Clamshell   GS with bridge   AA SLR  AA abduction   SAQ 1.5#       SELF MANAGEMENT:  Pain behavior recognition  PNE - educated in boom/bust cycles, pacing, output versus input, perceived experience of pain  PMR HEP  Postural awareness   Activity modulation - activity scheduling pacing   Use of heat/cold safely   Focus - onto function off pain more onto building quality of life with baseline of pain 7/10    ASSESSMENT:  Pt dee well. Pt able to perform full session despite the discomfort. Pt had good response to supine exercise, challenged " with standing. Dynamic balance is of good dee with UE recovery. Pt is progressing with ability and length of time performing CKC activity.        PLAN:   Progress integration of balance for strength of the hip and low back   Progress PNE     Billing: Therex x 3   [id number]

## 2025-06-10 ENCOUNTER — APPOINTMENT (OUTPATIENT)
Dept: ORTHOPEDIC SURGERY | Facility: CLINIC | Age: 55
End: 2025-06-10
Payer: COMMERCIAL

## 2025-06-10 DIAGNOSIS — M48.062 LUMBAR STENOSIS WITH NEUROGENIC CLAUDICATION: Primary | ICD-10-CM

## 2025-06-10 DIAGNOSIS — M47.816 LUMBAR SPONDYLOSIS: ICD-10-CM

## 2025-06-10 DIAGNOSIS — M76.01 GLUTEAL TENDINITIS OF BOTH BUTTOCKS: ICD-10-CM

## 2025-06-10 DIAGNOSIS — M76.02 GLUTEAL TENDINITIS OF BOTH BUTTOCKS: ICD-10-CM

## 2025-06-10 PROCEDURE — 4010F ACE/ARB THERAPY RXD/TAKEN: CPT | Performed by: PHYSICAL MEDICINE & REHABILITATION

## 2025-06-10 PROCEDURE — 3044F HG A1C LEVEL LT 7.0%: CPT | Performed by: PHYSICAL MEDICINE & REHABILITATION

## 2025-06-10 PROCEDURE — 99205 OFFICE O/P NEW HI 60 MIN: CPT | Performed by: PHYSICAL MEDICINE & REHABILITATION

## 2025-06-10 RX ORDER — GABAPENTIN 100 MG/1
100 CAPSULE ORAL 3 TIMES DAILY
Qty: 90 CAPSULE | Refills: 1 | Status: SHIPPED | OUTPATIENT
Start: 2025-06-10 | End: 2025-07-10

## 2025-06-10 SDOH — SOCIAL STABILITY: SOCIAL NETWORK: SOCIAL ACTIVITY:: 7

## 2025-06-10 NOTE — PROGRESS NOTES
New Consult/New Patient Note    6/10/2025   No ref. provider found    Assessment:    This is a very pleasant 54 yo female who presents for chronic left hip and non-radicular lower back pain. Despite conservative and non-surgical measures, her symptoms are significantly affecting her daily life and ability to ambulate.   -lumbar spondylosis causing severe central canal stenosis and impingement of peripheral spinal nerves in the lower lumbar spine, L4-5 as well as L5-S1  -lumbar facet arthropathy  -SI joint inflammation, more severe on the left   -bilateral greater trochanteric bursitis, more severe on the left  -IT band syndrome    Medical necessity: The patient is presenting primarily with Lumbar pain and claudication symptoms.  The pain is constant and of moderate severity that interferes with activities of daily living and sleep. The patient failed conservative therapy to include Tylenol/NSAIDS and physical therapy/supervised home exercise program.  Lumbar spine MRI which I personally reviewed showed severe central canal narrowing at L4-5 and L5-S1.  Plan is to proceed with Caudal Epidural Steroid Injection with fluoroscopy.  We discussed the risks, benefits and alternatives to the procedure(s) and the patient would like to proceed.  This procedure is part of a comprehensive and multimodal treatment plan to facility physical therapy and a supervised home exercise program.  - To my knowledge she has not had a lumbar GLO since December 26, 2024 which was a interlaminar approach.  Hopefully the caudal will provide better relief.      PLAN:  1)  Imaging/Diagnostic Studies:   -Reviewed results of renal function panel from 6/2/25. Cr is elevated at 1.4 and eGFR is decreased at 44.   -Reviewed and interpreted images from lumbar MRI in December 2024. It appears that the most significant degenerative changes are noted in L4-5 and L5-S1 with significant central canal stenosis and impingement of exiting spinal nerves at  these associated levels.  2)  Therapy/Rehabilitation: Continue PT sessions. Speak with your PT regarding techniques for IT band stretching and    3)  Pharmacological Management: Start gabapentin 100mg once daily and increase up to three times per day. Starting at low dose due to history of CKD stage III. Continue to monitor renal function.  4)  Spine/Surgical Interventions: Schedule caudal epidural steroid injection   5)  Alternative Treatments: May consider alternative treatment options in the future including manipulation (chiropractor versus osteopathic) and/or acupuncture if patient does not obtain optimal relief with initial treatment plan.  6)  Consultations: Referral made to Dr. Bennett for consideration of potential spinal decompression surgery  7)  Follow -up: 4-6 weeks or PRN if symptoms worsen/do not improve.   8)  Future treatment considerations:  -Consider bilateral MBB with possible RFA of L4-L5 and L5-S1 history of multiple injections with mostly short-lived relief.  Had a long discussion with her and her  that I feel there is not likely 1 injection to provide long-lasting significant benefit      Patient advised of the difference between hurt and harm and advised to continue with all normal activities and exercises. Patient verbalized understanding of the above plan and was happy with the care provided.      The above clinical summary has been dictated with voice recognition software. It has not been proofread for grammatical errors, typographical mistakes, or other semantic inconsistencies.    Thank you for visiting our office today. It was our pleasure to take part in your healthcare.     Do not hesitate to call with any questions regarding your plan of care after leaving at [phone]    To clinicians, thank you very much for this kind referral. It is a privilege to partner with you in the care of your patients. My office would be delighted to assist you with any further consultations or  "with questions regarding the plan of care outlined. Do not hesitate to call the office or contact me directly.     Tammi Palma DO   PM&R PGY-2    Sincerely,    RAFI Molina MD  , Physical Medicine and Rehabilitation, Orthopedic Spine  [facility]  [facility]         Jody Joyce \"Edilia" is a 55 y.o. female who presents with left hip and lower back pain for the last 15 years or so. The pain is a constant pain. It is fairly localized to the lower back with rare radiation of pain to the outside of her left leg (but doesn't go into the foot). She does get some leg swelling and a cold feeling to her leg. She has seen Dr. Abel and Dr. Jeffrey over the years. Has done multiple injections including lumbar GLO, bilateral SI, and lateral hip injections. Lumbar injections were most helpful but only lasted about 2 weeks. History of torn labrum in left hip with two surgeries. First surgery was in December of 2021 and second was in June 2022; neither helped with her pain. PT has not helped. Massage and acupuncture only provide short term relief. Has even seen psychiatry to see if there is a psychological component to her pain,but has not seen any benefit despite this evaluation. Has used tylenol and ibuprofen to manage the pain. Topicals have also not helped. Took vicodin in the past prior to her hip surgeries - didn't help. Tried gabapentin prior to her surgery but didn't see any effects; denies any negative side effects.     Location: left hip and lower back  Radiation: usually stays in left hip and lower back  Quality: current 7/10,  at its worst 10 /10  Exacerbated by everything  Relieved by sometimes cold, sometimes heat, and sometimes nothing  Onset, traumatic event: no  Has tried:  heat, ice, PT            Valsalva sign negative  Grocery cart sign is negative   Smoker:  no  Does wake them at night  Litigation: no    Patient " denies bowel/bladder incontinence, denies fever, denies unintentional weight loss, denies clumsiness of hands, feet, or dropping things.  Denies any constitutional or myelopathic symptomatology.      PREVIOUS TREATMENTS  IN THE LAST SIX MONTHS     Active conservative therapy  in the last six months (see below)              1. Physical therapy:   Yes                                                                                  2. Home exercise program after PT: Yes                                                   3. A physician supervised home exercise program (HEP):  No               4. Chiropractic Care: Yes - provided some relief                                                                    Passive conservative therapy  in the last six months (see below)              1. NSAIDS: ibuprofen                                                                                                          2. Prescription pain medication: vicodin - no longer taking                                                            3. Acupuncture: yes                                                                                          4. Tens unit: Yes - inconsistent relief     Assistive Devices: single point cane and electric scooter for long distances    Work status: ESL  and       ROS: Other than listed in HPI, PMHX below, and intake paperwork including a 30 point patient-recorded review of symptoms which was personally reviewed and inclusive of no history of unintentional weight loss, change in appetite, significant malaise, fevers, chills, or change in bowel/bladder, shortness of breath, or chest pain.    I have confirmed and edited as necessary Past Medical, Past Surgical, Family, Social History and ROS as obtained by others. These were also obtained on new patient forms.      PHYSICAL EXAM:   GENERAL APPEARANCE:  Well nourished, well developed, and no apparent distress.  NEURO PSYCH:  Patient oriented to person, place, Mood pleasant. Benign affect.  MUSCULOSKELETAL and NEUROLOGICAL       VISUAL INSPECTION           LUMBAR: WNL  SPINE ROM:   LUMBAR ROM: Limited in lumbar flexion and extension      PALPATION:           SPINOUS PROCESS: painful to palpate along the L4-sacrum at midline           PARASPINALS: somewhat painful to palpate along the lower aspect of the lumbar paraspinals and gluteal muscles  FACET LOADING: Positive. Negative 30 second lumbar extension test.   MUSCLE BULK: Normal and symmetrical in the lower extremities.  MUSCLE TONE: Normal  MOTOR: 5/5 in all muscle groups tested in bilateral lower extremities   SENSORY: Normal sensory exam to light touch  GAIT: Antalgic gait. Able to go up and heels and toes with no sig. weakness.  No sig. balance deficit appreciated.  REFLEXES: +2 to bilateral lower extremities  LONG TRACT SIGNS: No clonus  STRAIGHT LEG TEST: Negative bilaterally  PERIPHERAL JOINT ROM:   HIP ROM: Limited in external rotation bilaterally likely 2/2 tight IT bands  MARIALUISA/Thigh Thrust:  MARIALUISA on the right reproduces pain in the contralateral lower back/buttock. Thigh thrust negative.    DATA REVIEW:   The below imaging studies were personally reviewed and discussed with the patient.  -Lumbar MRI wo contrast from December 2024    Medical Decision Making:  The above note constitutes a Moderate to High level of medical decision making based on past data and imaging review, new and chronic symptoms with exacerbation, change in weakness or sensation, new imaging and diagnostic studies ordered, discussion of potential interventional or surgical treatment options, acute or chronic pain that may pose a threat to bodily function.    Medical History[1]    Medication Documentation Review Audit       Reviewed by Juhi Layton RN (Registered Nurse) on 03/27/25 at 0815      Medication Order Taking? Sig Documenting Provider Last Dose Status   acetaminophen (Tylenol) 325 mg tablet  [id number] Yes Take 2 tablets (650 mg) by mouth every 6 hours if needed for mild pain (1 - 3). Historical Provider, MD Past Month Active   atorvastatin (Lipitor) 40 mg tablet [id number] Yes Take 1 tablet (40 mg) by mouth once daily at bedtime. Bonnie Mi MD 3/26/2025 Evening Active   BD Ultra-Fine Mini Pen Needle 31 gauge x 3/16\" needle [id number]   Historical Provider, MD  Active   blood sugar diagnostic strip [id number]  CHECK BLOOD SUGARS 4 TIMES DAILY,  ICD-10: E11.9 Historical Provider, MD  Active   dapagliflozin propanediol (Farxiga) 10 mg [id number] Yes Take 1 tablet (10 mg) by mouth once daily in the morning. Historical Provider, MD 3/26/2025 Morning Active   estradiol (Estrace) 0.01 % (0.1 mg/gram) vaginal cream [id number] Yes 1 g in applicator nightly.  Disp # 3,  42.5 gm tubes (90Days)  Refill # Dov Zhang MD Past Week Active   famotidine (Pepcid) 40 mg tablet [id number] Yes Take 1 tablet (40 mg) by mouth 2 times a day. Angelito Aguilar MD 3/26/2025 Evening Active   FreeStyle Jose 3 Sensor device [id number]  USE AS DIRECTED TO MONITOR BLOOD GLUCOSE Historical Provider, MD  Active   Lantus Solostar U-100 Insulin 100 unit/mL (3 mL) pen [id number] Yes Inject 15 units daily in the morning Bonnie Mi MD 3/27/2025 Morning Active   levETIRAcetam (Keppra) 1,000 mg tablet [id number] Yes  Historical Provider, MD 3/26/2025 Evening Active   losartan (Cozaar) 50 mg tablet [id number] Yes Take 0.5 tablets (25 mg) by mouth once daily. Bonnie Mi MD 3/26/2025 Morning Active   metoprolol succinate XL (Toprol-XL) 100 mg 24 hr tablet [id number] Yes  Historical Provider, MD 3/26/2025 Morning Active   NovoLOG Flexpen U-100 Insulin 100 unit/mL (3 mL) pen [id number] Yes INJECT 4 UNITS SUBCUTANEOUSLY 3 TIMES A DAY WITH MEALS PLUS SLIDING SCALE #1. TOTAL DAILY DOSE 30 UNITS Bonnie Mi MD 3/26/2025 Evening Active   ondansetron ODT (Zofran-ODT) 4 mg disintegrating tablet [id number] No Dissolve 1 tablet " (4 mg) in the mouth 3 times a day as needed.   Patient not taking: Reported on 3/27/2025    Historical Provider, MD More than a month Active   oxybutynin XL (Ditropan-XL) 10 mg 24 hr tablet [id number] Yes Oxybutynin Chloride ER 10 MG Oral Tablet Extended Release 24 Hour   Quantity: 90  Refills: 0        Start : 27-Sep-2021   Active Historical Provider, MD 3/26/2025 Morning Active   rizatriptan MLT (Maxalt-MLT) 10 mg disintegrating tablet [id number] No Dissolve 1 tablet (10 mg) in the mouth.   Patient not taking: Reported on 3/27/2025    Historical Provider, MD More than a month Active   semaglutide 0.25 mg or 0.5 mg (2 mg/3 mL) pen injector [id number] Yes Inject 0.25 mg under the skin every 7 days. Inject 0.25 mg weekly for 4 weeks then increase to 0.5 mg. Jordan Eller MD Past Week Active   venlafaxine XR (Effexor-XR) 150 mg 24 hr capsule [id number] Yes Take 1 capsule (150 mg) by mouth once daily. Historical Provider, MD 3/26/2025 Evening Active                    RX Allergies[2]    Social History     Socioeconomic History    Marital status: Significant Other     Spouse name: Not on file    Number of children: Not on file    Years of education: Not on file    Highest education level: Not on file   Occupational History    Not on file   Tobacco Use    Smoking status: Never     Passive exposure: Never    Smokeless tobacco: Never   Vaping Use    Vaping status: Never Used   Substance and Sexual Activity    Alcohol use: Yes     Comment: rarely    Drug use: Never    Sexual activity: Not Currently     Partners: Male   Other Topics Concern    Not on file   Social History Narrative    Not on file     Social Drivers of Health     Financial Resource Strain: Low Risk  (6/1/2025)    Received from [facility]    Overall Financial Resource Strain (CARDIA)     Difficulty of Paying Living Expenses: Not hard at all   Food Insecurity: No Food Insecurity (6/1/2025)    Received from [facility]    Hunger Vital Sign     Worried  About Running Out of Food in the Last Year: Never true     Ran Out of Food in the Last Year: Never true   Transportation Needs: No Transportation Needs (6/1/2025)    Received from [facility]    PRAPARE - Transportation     Lack of Transportation (Medical): No     Lack of Transportation (Non-Medical): No   Physical Activity: Inactive (6/1/2025)    Received from [facility]    Exercise Vital Sign     Days of Exercise per Week: 0 days     Minutes of Exercise per Session: 0 min   Stress: No Stress Concern Present (6/1/2025)    Received from [facility]    [employer] [place] [employer] - Occupational Stress Questionnaire     Feeling of Stress : Not at all   Social Connections: Moderately Isolated (6/1/2025)    Received from [facility]    Social Connection and Isolation Panel [NHANES]     Frequency of Communication with Friends and Family: Twice a week     Frequency of Social Gatherings with Friends and Family: Never     Attends Mu-ism Services: Never     Active Member of Clubs or Organizations: Yes     Attends Club or Organization Meetings: Never     Marital Status: Living with partner   Intimate Partner Violence: Not on file   Housing Stability: Low Risk  (3/21/2024)    Received from [facility]    Housing Stability Vital Sign     Unable to Pay for Housing in the Last Year: No     Number of Places Lived in the Last Year: 1     Unstable Housing in the Last Year: No       Surgical History[3]         [1]   Past Medical History:  Diagnosis Date    Chronic pain disorder 1/1/2000    COVID-19     Diverticulosis of large intestine without perforation or abscess without bleeding     Diverticulosis of large intestine without hemorrhage    Encounter for gynecological examination (general) (routine) without abnormal findings 08/29/2022    Encounter for well woman exam with routine gynecological exam    Endometriosis 1/1/1990    Fractures 4/1/1991    Joint pain 1/1/1995    Low back pain  1/1/1989    Migraine 1/1/1990    Personal history of other diseases of the female genital tract     History of dysfunctional uterine bleeding    Personal history of other diseases of the nervous system and sense organs     History of migraine    Personal history of other diseases of the respiratory system 04/29/2016    History of acute sinusitis    Personal history of other medical treatment 08/29/2022    History of screening mammography    Personal history of urinary calculi     History of renal calculi    Trochanteric bursitis, right hip     Greater trochanteric bursitis of both hips   [2]   Allergies  Allergen Reactions    Oxycodone-Acetaminophen Unknown     vomiting    Sutures Other     Catgut sutures rejected got infection    Amoxicillin-Pot Clavulanate Other     yeast infection   [3]   Past Surgical History:  Procedure Laterality Date    BREAST BIOPSY      LAPAROSCOPY DIAGNOSTIC / BIOPSY / ASPIRATION / LYSIS  04/11/2016    Laparoscopy (Diagnostic)    TRIGGER POINT INJECTION  1/1/1996

## 2025-06-11 ENCOUNTER — TREATMENT (OUTPATIENT)
Dept: PHYSICAL THERAPY | Facility: CLINIC | Age: 55
End: 2025-06-11
Payer: COMMERCIAL

## 2025-06-11 DIAGNOSIS — M48.061 SPINAL STENOSIS, LUMBAR REGION, WITHOUT NEUROGENIC CLAUDICATION: ICD-10-CM

## 2025-06-11 DIAGNOSIS — M54.50 CHRONIC LEFT-SIDED LOW BACK PAIN WITHOUT SCIATICA: ICD-10-CM

## 2025-06-11 DIAGNOSIS — M25.552 LEFT HIP PAIN: ICD-10-CM

## 2025-06-11 DIAGNOSIS — G89.29 CHRONIC LEFT-SIDED LOW BACK PAIN WITHOUT SCIATICA: ICD-10-CM

## 2025-06-11 PROCEDURE — 97110 THERAPEUTIC EXERCISES: CPT | Mod: GP

## 2025-06-11 NOTE — PROGRESS NOTES
Physical Therapy Treatment    Patient Name: Jody Joyce  MRN: [id number]  Today's Date: 6/11/2025  Visit: 16  Referred by: Dr. Bennett      Diagnosis:   1. Chronic left-sided low back pain without sciatica  Follow Up In Physical Therapy      2. Left hip pain  Follow Up In Physical Therapy      3. Spinal stenosis, lumbar region, without neurogenic claudication  Follow Up In Physical Therapy        PRECAUTIONS:   Has had 2 labral repairs of the hip at [facility] '21 & '22.   MRI 12-5-24: L3-4 disc bulge, mild canal stenosis, facet arthropathy, mild lateral recess and foraminal stenosis.  L4-5 disc bulge, severe R) mod. L) facet hypertrophy, mod canal stenosis severe bilat lateral recess narrowing and L5 NR impingement,   L5-S1 disc bulge severe bilat facet hypertrophy, severe canal stenosis, severe bilat. Lateral recess narrowing, S1 NR impingement, mod to severe bilat neuroforaminal narrowing.       SUBJECTIVE:  Saw Dr. Molina. Has a treatment path in working with him. Continues to be limited by pain.     Pain: 6-8/10  HEP: fair compliance       OBJECTIVE:  Able to indp do a s/l abduction raise     TREATMENT:    Nustep level 3 10 min.   Stair stretches - hip flex, hamstring, gastroc 3 x 20 sec mayuri       Air ex x 20 mayuri:   Weight shift AP, lateral   CR  Step taps 10 inch  Mini movement 3 way hip mayuri   Hip hike     Step up 4 inch x 12 mayuri     SLS and sh romberg activity 5 min.     Supine x 20 with coordinated breathing:  PT   Abduction yellow TB supine Clamshell   GS with bridge   AA SLR  AA abduction s/l  SAQ 1.5#   Dead bug     SELF MANAGEMENT:  Pain behavior recognition  PNE - educated in boom/bust cycles, pacing, output versus input, perceived experience of pain  PMR HEP  Postural awareness   Activity modulation - activity scheduling pacing   Use of heat/cold safely   Focus - onto function off pain more onto building quality of life with baseline of pain 7/10    ASSESSMENT:  Pt is happy with the addition of  Dr. Molina and his plan to assist her. Pt knows PT as well as getting stronger is a part of the plan. Pt able to dee activity today for DLS and hip strengthening. Improved dee of CKC in length of time. Pt with relief post PT with use of CP. Fatigue a factor as session progressed.        PLAN:   Progress integration of balance for strength of the hip and low back   Progress PNE     Billing: Therex x 3   [id number]

## 2025-06-17 ENCOUNTER — TREATMENT (OUTPATIENT)
Dept: PHYSICAL THERAPY | Facility: CLINIC | Age: 55
End: 2025-06-17
Payer: COMMERCIAL

## 2025-06-17 DIAGNOSIS — M54.50 CHRONIC LEFT-SIDED LOW BACK PAIN WITHOUT SCIATICA: ICD-10-CM

## 2025-06-17 DIAGNOSIS — G89.29 CHRONIC LEFT-SIDED LOW BACK PAIN WITHOUT SCIATICA: ICD-10-CM

## 2025-06-17 DIAGNOSIS — M48.061 SPINAL STENOSIS, LUMBAR REGION, WITHOUT NEUROGENIC CLAUDICATION: ICD-10-CM

## 2025-06-17 DIAGNOSIS — M25.552 LEFT HIP PAIN: ICD-10-CM

## 2025-06-17 PROCEDURE — 97110 THERAPEUTIC EXERCISES: CPT | Mod: GP

## 2025-06-17 NOTE — PROGRESS NOTES
Physical Therapy Treatment    Patient Name: Jody Joyce  MRN: [id number]  Today's Date: 6/17/2025  Visit: 17  Referred by: Dr. Bennett      Diagnosis:   1. Chronic left-sided low back pain without sciatica  Follow Up In Physical Therapy      2. Left hip pain  Follow Up In Physical Therapy      3. Spinal stenosis, lumbar region, without neurogenic claudication  Follow Up In Physical Therapy        PRECAUTIONS:   Has had 2 labral repairs of the hip at [facility] '21 & '22.   MRI 12-5-24: L3-4 disc bulge, mild canal stenosis, facet arthropathy, mild lateral recess and foraminal stenosis.  L4-5 disc bulge, severe R) mod. L) facet hypertrophy, mod canal stenosis severe bilat lateral recess narrowing and L5 NR impingement,   L5-S1 disc bulge severe bilat facet hypertrophy, severe canal stenosis, severe bilat. Lateral recess narrowing, S1 NR impingement, mod to severe bilat neuroforaminal narrowing.       SUBJECTIVE:  Feels OK this AM. Has a sore spot on her left low back, tight.     Pain: 6-8/10  HEP: fair compliance       OBJECTIVE:  Able to indp do a s/l abduction raise     TREATMENT:    Nustep level 3 10 min.   Stair stretches - hip flex, hamstring, gastroc 3 x 20 sec mayuri     Air ex x 20 mayuri:   Weight shift AP, lateral   CR  Step taps 10 inch  Mini movement 3 way hip mayuri   Hip hike    Marches     Step up 4 inch x 12 amyuri     SLS and sh romberg activity 5 min. Air ex     Supine x 20 with coordinated breathing:  PT   Abduction red TB supine Clamshell   GS with bridge   AA SLR  AA abduction s/l  SAQ 1.5#   Dead bug     SELF MANAGEMENT:  Pain behavior recognition  PNE - educated in boom/bust cycles, pacing, output versus input, perceived experience of pain  PMR HEP  Postural awareness   Activity modulation - activity scheduling pacing   Use of heat/cold safely   Focus - onto function off pain more onto building quality of life with baseline of pain 7/10    ASSESSMENT:   Supine SLR and abduction with minor assistance,  mostly INDP. Pt demonstrates a good progression of dee of activity. No rest required during session. Performed in a paced and measured way. Cued for quality over reps. Distraction of conversation works well thru session. Relief with CP. Improved strength and functional mobility demonstrated. Balance improving.     PLAN:   Progress integration of balance for strength of the hip and low back   Progress PNE     Billing: Therex x 3   [phone]

## 2025-06-20 ENCOUNTER — TREATMENT (OUTPATIENT)
Dept: PHYSICAL THERAPY | Facility: CLINIC | Age: 55
End: 2025-06-20
Payer: COMMERCIAL

## 2025-06-20 DIAGNOSIS — M25.552 LEFT HIP PAIN: ICD-10-CM

## 2025-06-20 DIAGNOSIS — M48.061 SPINAL STENOSIS, LUMBAR REGION, WITHOUT NEUROGENIC CLAUDICATION: ICD-10-CM

## 2025-06-20 DIAGNOSIS — G89.29 CHRONIC LEFT-SIDED LOW BACK PAIN WITHOUT SCIATICA: ICD-10-CM

## 2025-06-20 DIAGNOSIS — M54.50 CHRONIC LEFT-SIDED LOW BACK PAIN WITHOUT SCIATICA: ICD-10-CM

## 2025-06-20 PROCEDURE — 97110 THERAPEUTIC EXERCISES: CPT | Mod: GP

## 2025-06-20 NOTE — PROGRESS NOTES
"Physical Therapy Treatment    Patient Name: Jody Joyce  MRN: [id number]  Today's Date: 6/20/2025  Visit: 18  Referred by: Dr. Bennett  Time Calculation  Start Time: 0830  Stop Time: 0915  Time Calculation (min): 45 min    Diagnosis:   1. Chronic left-sided low back pain without sciatica  Follow Up In Physical Therapy      2. Left hip pain  Follow Up In Physical Therapy      3. Spinal stenosis, lumbar region, without neurogenic claudication  Follow Up In Physical Therapy          PRECAUTIONS:   Has had 2 labral repairs of the hip at [facility] '21 & '22.   MRI 12-5-24: L3-4 disc bulge, mild canal stenosis, facet arthropathy, mild lateral recess and foraminal stenosis.  L4-5 disc bulge, severe R) mod. L) facet hypertrophy, mod canal stenosis severe bilat lateral recess narrowing and L5 NR impingement,   L5-S1 disc bulge severe bilat facet hypertrophy, severe canal stenosis, severe bilat. Lateral recess narrowing, S1 NR impingement, mod to severe bilat neuroforaminal narrowing.       SUBJECTIVE:  \"Same old same old\". Tolerated last session well. Pain level at normal 7/10 status.  Pain: 7/10  HEP: fair compliance     OBJECTIVE:  Able to indp do a s/l abduction raise     TREATMENT:  Nustep level 3  5  min.   Stair stretches - hip flex, hamstring, gastroc 3 x 20 sec mayuri     Air ex x 20 mayuri:   Weight shift AP, lateral   CR  Step taps 10 inch  Mini movement 3 way hip mayuri   Hip hike   Marches     Step up 4 inch x 12 mayuri     SLS and sh romberg activity 5 min. Air ex     Supine x 20 with coordinated breathing:  PT   Abduction red TB supine Clamshell   GS with bridge   SLR  AA abduction s/l  SAQ 1.5#   Dead bug  (knee flexion with diagonal UE to knee)    ASSESSMENT:  Pita Joyce  tolerated session progression well and required prompts for proper performance of skilled PT exercises and posture. Right hip hike is more challenging then L. Pt decline ice.  Distraction of conversation works well thru session.     PLAN: "   Progress integration of balance for strength of the hip and low back. Next visit increase SAQ wt to 2# and SLR increased ROM  Progress PNE     Billing:     PT Therapeutic Procedures Time Entry  Therapeutic Exercise Time Entry: 45

## 2025-07-01 ENCOUNTER — HOSPITAL ENCOUNTER (OUTPATIENT)
Dept: OPERATING ROOM | Facility: CLINIC | Age: 55
Discharge: HOME | End: 2025-07-01
Payer: COMMERCIAL

## 2025-07-01 VITALS
WEIGHT: 176.37 LBS | OXYGEN SATURATION: 98 % | TEMPERATURE: 97.5 F | RESPIRATION RATE: 16 BRPM | HEART RATE: 60 BPM | SYSTOLIC BLOOD PRESSURE: 159 MMHG | DIASTOLIC BLOOD PRESSURE: 74 MMHG | HEIGHT: 61 IN | BODY MASS INDEX: 33.3 KG/M2

## 2025-07-01 DIAGNOSIS — M48.062 LUMBAR STENOSIS WITH NEUROGENIC CLAUDICATION: ICD-10-CM

## 2025-07-01 PROCEDURE — 2500000004 HC RX 250 GENERAL PHARMACY W/ HCPCS (ALT 636 FOR OP/ED): Mod: JW | Performed by: PHYSICAL MEDICINE & REHABILITATION

## 2025-07-01 PROCEDURE — 2550000001 HC RX 255 CONTRASTS: Mod: JW | Performed by: PHYSICAL MEDICINE & REHABILITATION

## 2025-07-01 PROCEDURE — 2500000004 HC RX 250 GENERAL PHARMACY W/ HCPCS (ALT 636 FOR OP/ED): Performed by: PHYSICAL MEDICINE & REHABILITATION

## 2025-07-01 PROCEDURE — 7100000009 HC PHASE TWO TIME - INITIAL BASE CHARGE

## 2025-07-01 PROCEDURE — 3600000006 HC OR TIME - EACH INCREMENTAL 1 MINUTE - PROCEDURE LEVEL ONE

## 2025-07-01 PROCEDURE — 3600000001 HC OR TIME - INITIAL BASE CHARGE - PROCEDURE LEVEL ONE

## 2025-07-01 PROCEDURE — 7100000010 HC PHASE TWO TIME - EACH INCREMENTAL 1 MINUTE

## 2025-07-01 PROCEDURE — 62323 NJX INTERLAMINAR LMBR/SAC: CPT | Performed by: PHYSICAL MEDICINE & REHABILITATION

## 2025-07-01 RX ORDER — SODIUM BICARBONATE 42 MG/ML
INJECTION, SOLUTION INTRAVENOUS AS NEEDED
Status: COMPLETED | OUTPATIENT
Start: 2025-07-01 | End: 2025-07-01

## 2025-07-01 RX ORDER — DEXAMETHASONE SODIUM PHOSPHATE 10 MG/ML
INJECTION INTRAMUSCULAR; INTRAVENOUS AS NEEDED
Status: COMPLETED | OUTPATIENT
Start: 2025-07-01 | End: 2025-07-01

## 2025-07-01 RX ORDER — LIDOCAINE HYDROCHLORIDE 10 MG/ML
INJECTION, SOLUTION INFILTRATION; PERINEURAL AS NEEDED
Status: COMPLETED | OUTPATIENT
Start: 2025-07-01 | End: 2025-07-01

## 2025-07-01 RX ADMIN — LIDOCAINE HYDROCHLORIDE 10 ML: 10 INJECTION, SOLUTION INFILTRATION; PERINEURAL at 08:56

## 2025-07-01 RX ADMIN — SODIUM BICARBONATE 0.5 MEQ: 42 INJECTION, SOLUTION INTRAVENOUS at 08:57

## 2025-07-01 RX ADMIN — DEXAMETHASONE SODIUM PHOSPHATE 10 MG: 10 INJECTION INTRAMUSCULAR; INTRAVENOUS at 08:57

## 2025-07-01 RX ADMIN — IOHEXOL 5 ML: 240 INJECTION, SOLUTION INTRATHECAL; INTRAVASCULAR; INTRAVENOUS; ORAL at 08:57

## 2025-07-01 ASSESSMENT — ENCOUNTER SYMPTOMS
AGITATION: 0
NAUSEA: 0
ABDOMINAL PAIN: 0
FEVER: 0
SHORTNESS OF BREATH: 0
APNEA: 0
BACK PAIN: 1
CHEST TIGHTNESS: 0
ARTHRALGIAS: 1
CHILLS: 0

## 2025-07-01 ASSESSMENT — COLUMBIA-SUICIDE SEVERITY RATING SCALE - C-SSRS
2. HAVE YOU ACTUALLY HAD ANY THOUGHTS OF KILLING YOURSELF?: NO
6. HAVE YOU EVER DONE ANYTHING, STARTED TO DO ANYTHING, OR PREPARED TO DO ANYTHING TO END YOUR LIFE?: NO
1. IN THE PAST MONTH, HAVE YOU WISHED YOU WERE DEAD OR WISHED YOU COULD GO TO SLEEP AND NOT WAKE UP?: NO

## 2025-07-01 ASSESSMENT — PAIN - FUNCTIONAL ASSESSMENT
PAIN_FUNCTIONAL_ASSESSMENT: 0-10
PAIN_FUNCTIONAL_ASSESSMENT: 0-10

## 2025-07-01 ASSESSMENT — PAIN SCALES - GENERAL
PAINLEVEL_OUTOF10: 7
PAINLEVEL_OUTOF10: 0 - NO PAIN

## 2025-07-01 ASSESSMENT — PAIN DESCRIPTION - DESCRIPTORS: DESCRIPTORS: ACHING

## 2025-07-01 NOTE — DISCHARGE INSTRUCTIONS
"Post Procedure Instructions     1. You MUST have a  to take you home and be available to assist you at home if needed, unless specifically arranged before procedure.     2. Get up from your seated or lying position slowly and carefully. It is not unusual to have some \"numbness\" in your back or legs following a lumbar injection. After a cervical injection it is not abnormal to have arm or neck numbness. The symptoms (if they occur) may last a few hours, but will wear off. Have someone assist you as you walk if numbness in your legs occurs.     3. Complete sensory block is the intent of an injection to nerves. Occasionally in trying to achieve sensory blockade you also receive a motor blockade (weak arm or leg) 4. If you receive sedation, do not drive a motorized vehicle for 24 hours.     5. Avoid ALL alcoholic beverages the day of your procedure.     6. Discuss sleep and pain medications with your prescribing physician if you received sedation.     7. It is safe to resume medications once home.     8. Tomorrow you may resume regular activities to the level your pain will tolerate. The exception is no lifting over 20 pounds for 36 hours. You will likely experience a temporary exacerbation of pre-injection pain when anesthetic medication wears off.     9. Remove your band aid tomorrow.     10. Do not take a tub bath or submerge in water for 48 hours. You may shower.     11. Apply ice to your injection site if it is swollen or hurts after the injection. Only apply 20 minutes on, then off for 20 minutes. Take the ice off as you sleep.     12. Call scheduling office at [phone] to verify a follow-up appointment 4-6 weeks after your procedure / injection with your pain diary.     13. Call your physician immediately or go to the emergency room for any of the following symptoms:    Severe pain at your injection site (tightness or aching is normal)    Pain, redness, pus, or leaking fluid at the injection site    " "Prolonged or increasing numbness 14 hours after a block    A temperature over 101.5 degrees F and / or chills    Excessive bruising, bleeding, or swelling at the injection site    Loss of bowel or bladder control or \"saddle anesthesia\"    Inability to speak, facial droop, and / or limb paralysis     Note: if you have any questions please contact our office at [phone], if the office is closed please call the after hours phone number at [phone] and ask for the pain resident on call    RAFI Molina M.D.  Medicine and Rehabilitation, [facility]     To schedule FOLLOW-UP appointments, please use the call center at [phone].   To schedule future PROCEDURES or for questions for the doctor please call [phone].    "

## 2025-07-01 NOTE — H&P
"History Of Present Illness  Jody Joyce \"Edilia" is a 55 y.o. female presenting with chronic lower back and intermittent bilateral leg pain refractory to conservative treatment, achy and burning, worse with activity, better at rest, trying to avoid surgery, pain can be a 7-9/10.     Past Medical History  Medical History[1]    Surgical History  Surgical History[2]     Social History  She reports that she has never smoked. She has never been exposed to tobacco smoke. She has never used smokeless tobacco. She reports current alcohol use. She reports that she does not use drugs.    Family History  Family History[3]     Allergies  Oxycodone-acetaminophen, Sutures, and Amoxicillin-pot clavulanate    Review of Systems   Constitutional:  Negative for chills and fever.   Respiratory:  Negative for apnea, chest tightness and shortness of breath.    Cardiovascular:  Negative for chest pain.   Gastrointestinal:  Negative for abdominal pain and nausea.   Musculoskeletal:  Positive for arthralgias and back pain.   Psychiatric/Behavioral:  Negative for agitation.    All other systems reviewed and are negative.       Physical Exam  Vitals reviewed.   Constitutional:       Appearance: Normal appearance.   HENT:      Head: Atraumatic.   Pulmonary:      Effort: Pulmonary effort is normal.      Breath sounds: Normal breath sounds.   Neurological:      Mental Status: She is alert and oriented to person, place, and time. Mental status is at baseline.   Psychiatric:         Mood and Affect: Mood normal.         Behavior: Behavior is cooperative.          Last Recorded Vitals  Blood pressure 159/77, pulse 51, temperature 36 °C (96.8 °F), temperature source Temporal, resp. rate 18, height (!) 1.549 m (5' 1\"), weight 80 kg (176 lb 5.9 oz), SpO2 97%, not currently breastfeeding.    Relevant Results         Assessment & Plan  Lumbar stenosis with neurogenic claudication      Caudal GLO      Lora Molina MD         [1]   Past Medical " History:  Diagnosis Date    Arthritis     Chronic pain disorder 1/1/2000    COVID-19     Delayed emergence from general anesthesia     Diverticulosis of large intestine without perforation or abscess without bleeding     Diverticulosis of large intestine without hemorrhage    Encounter for gynecological examination (general) (routine) without abnormal findings 08/29/2022    Encounter for well woman exam with routine gynecological exam    Endometriosis 1/1/1990    Fractures 4/1/1991    GERD (gastroesophageal reflux disease)     Joint pain 1/1/1995    Kidney disease     stage 3    Low back pain 1/1/1989    Migraine 1/1/1990    Personal history of other diseases of the female genital tract     History of dysfunctional uterine bleeding    Personal history of other diseases of the nervous system and sense organs     History of migraine    Personal history of other diseases of the respiratory system 04/29/2016    History of acute sinusitis    Personal history of other medical treatment 08/29/2022    History of screening mammography    Personal history of urinary calculi     History of renal calculi    Sleep apnea     Spinal stenosis     Trochanteric bursitis, right hip     Greater trochanteric bursitis of both hips    Type 2 diabetes mellitus    [2]   Past Surgical History:  Procedure Laterality Date    BREAST BIOPSY      GANGLION CYST EXCISION Right     LAPAROSCOPY DIAGNOSTIC / BIOPSY / ASPIRATION / LYSIS  04/11/2016    Laparoscopy (Diagnostic)    TRIGGER POINT INJECTION  1/1/1996   [3]   Family History  Family history unknown: Yes

## 2025-07-03 ENCOUNTER — TREATMENT (OUTPATIENT)
Dept: PHYSICAL THERAPY | Facility: CLINIC | Age: 55
End: 2025-07-03
Payer: COMMERCIAL

## 2025-07-03 DIAGNOSIS — M25.552 LEFT HIP PAIN: ICD-10-CM

## 2025-07-03 DIAGNOSIS — G89.29 CHRONIC LEFT-SIDED LOW BACK PAIN WITHOUT SCIATICA: ICD-10-CM

## 2025-07-03 DIAGNOSIS — M48.061 SPINAL STENOSIS, LUMBAR REGION, WITHOUT NEUROGENIC CLAUDICATION: ICD-10-CM

## 2025-07-03 DIAGNOSIS — M54.50 CHRONIC LEFT-SIDED LOW BACK PAIN WITHOUT SCIATICA: ICD-10-CM

## 2025-07-03 PROCEDURE — 97110 THERAPEUTIC EXERCISES: CPT | Mod: GP

## 2025-07-03 NOTE — PROGRESS NOTES
Physical Therapy Treatment    Patient Name: Jody Joyce  MRN: [id number]  Today's Date: 7/3/2025  Visit: 19  Referred by: Dr. Bennett  Time Calculation  Start Time: 0945  Stop Time: 1035  Time Calculation (min): 50 min    Diagnosis:   1. Chronic left-sided low back pain without sciatica  Follow Up In Physical Therapy      2. Left hip pain  Follow Up In Physical Therapy      3. Spinal stenosis, lumbar region, without neurogenic claudication  Follow Up In Physical Therapy          PRECAUTIONS:   Has had 2 labral repairs of the hip at [facility] '21 & '22.   MRI 12-5-24: L3-4 disc bulge, mild canal stenosis, facet arthropathy, mild lateral recess and foraminal stenosis.  L4-5 disc bulge, severe R) mod. L) facet hypertrophy, mod canal stenosis severe bilat lateral recess narrowing and L5 NR impingement,   L5-S1 disc bulge severe bilat facet hypertrophy, severe canal stenosis, severe bilat. Lateral recess narrowing, S1 NR impingement, mod to severe bilat neuroforaminal narrowing.       SUBJECTIVE:  Had injections in her low back. Feels better pain down from an 8 to a 4-5/10.   Less pin point pain in her left low back area.     Pain: 4-5/10  HEP: fair compliance     OBJECTIVE:      TREATMENT:  Nustep level 3  5  min.   Stair stretches - hip flex, hamstring, gastroc 3 x 20 sec mayuri     Air ex x 20 mayuri:   Weight shift AP, lateral   CR  Step taps 10 inch  Mini movement 3 way hip mayuri   Hip hike   Marches     SLS and sh romberg activity 5 min. Air ex   Dynamic stepping activity AP, lateral over 1/2 roller     Supine x 20 with coordinated breathing:  PT   Abduction red TB supine Clamshell   GS with bridge   SLR  AA abduction s/l  SAQ 1.5#   Dead bug  (knee flexion with diagonal UE to knee)    ASSESSMENT:  Pt dee well with improved dee of all strength activity. Pt with significantly more CKC activity completed over lats 2 sessions. Displays good balance reactions with UE and stepping reactions. No facial grimace with supine  core and hip strength exercises. Pt is demonstrated good dee and progress.         PLAN:   Progress integration of balance for strength of the hip and low back. Next visit increase SAQ wt to 2# and SLR increased ROM  Progress PNE     Billing:     PT Therapeutic Procedures Time Entry  Therapeutic Exercise Time Entry: 50

## 2025-07-07 ENCOUNTER — TREATMENT (OUTPATIENT)
Dept: PHYSICAL THERAPY | Facility: CLINIC | Age: 55
End: 2025-07-07
Payer: COMMERCIAL

## 2025-07-07 DIAGNOSIS — M54.50 CHRONIC LEFT-SIDED LOW BACK PAIN WITHOUT SCIATICA: ICD-10-CM

## 2025-07-07 DIAGNOSIS — G89.29 CHRONIC LEFT-SIDED LOW BACK PAIN WITHOUT SCIATICA: ICD-10-CM

## 2025-07-07 DIAGNOSIS — M25.552 LEFT HIP PAIN: ICD-10-CM

## 2025-07-07 DIAGNOSIS — M48.061 SPINAL STENOSIS, LUMBAR REGION, WITHOUT NEUROGENIC CLAUDICATION: ICD-10-CM

## 2025-07-07 PROCEDURE — 97110 THERAPEUTIC EXERCISES: CPT | Mod: GP

## 2025-07-07 NOTE — PROGRESS NOTES
Physical Therapy Treatment    Patient Name: Jody Joyce  MRN: [id number]  Today's Date: 7/7/2025  Visit: 20  Referred by: Dr. Bennett  Time Calculation  Start Time: 0030  Stop Time: 0115  Time Calculation (min): 45 min    Diagnosis:   1. Chronic left-sided low back pain without sciatica  Follow Up In Physical Therapy      2. Left hip pain  Follow Up In Physical Therapy      3. Spinal stenosis, lumbar region, without neurogenic claudication  Follow Up In Physical Therapy          PRECAUTIONS:   Has had 2 labral repairs of the hip at [facility] '21 & '22.   MRI 12-5-24: L3-4 disc bulge, mild canal stenosis, facet arthropathy, mild lateral recess and foraminal stenosis.  L4-5 disc bulge, severe R) mod. L) facet hypertrophy, mod canal stenosis severe bilat lateral recess narrowing and L5 NR impingement,   L5-S1 disc bulge severe bilat facet hypertrophy, severe canal stenosis, severe bilat. Lateral recess narrowing, S1 NR impingement, mod to severe bilat neuroforaminal narrowing.       SUBJECTIVE:  Feels OK. Continues to have less pain in the low back.     Pain: 4-5/10  HEP: fair compliance     OBJECTIVE:      TREATMENT:  Nustep level 3  5  min.   Stair stretches - hip flex, hamstring, gastroc 3 x 20 sec mayuri     Air ex x 20 mayuri:   Weight shift AP, lateral   CR  Step taps 10 inch  Mini movement 3 way hip mayuri   Hip hike   Marches     SLS and sh romberg activity 5 min. Air ex   Dynamic stepping activity AP, lateral over 1/2 roller     Supine x 20 with coordinated breathing:  PT   Abduction red TB supine Clamshell   GS with bridge   SLR  AA abduction s/l  SAQ 1.5#   Dead bug  (knee flexion with diagonal UE to knee)    Gait activity   Lateral 10 ft x 2  Retro 10 ft x 2   Anterior with step length 10 ft x 2    ASSESSMENT:  Pt with good dee of dynamic activity for LE strength. Adapted well to gait training yet was fatigued. Pt challenged with retro ambulation. Pt with rest provided prior to leaving PT. Pt with ability to  ambulate safely INDP.       PLAN:   Progress dynamic gait activity NV     Billing:     PT Therapeutic Procedures Time Entry  Therapeutic Exercise Time Entry: 45

## 2025-07-15 ENCOUNTER — TREATMENT (OUTPATIENT)
Dept: PHYSICAL THERAPY | Facility: CLINIC | Age: 55
End: 2025-07-15
Payer: COMMERCIAL

## 2025-07-15 DIAGNOSIS — M25.552 LEFT HIP PAIN: ICD-10-CM

## 2025-07-15 DIAGNOSIS — M54.50 CHRONIC LEFT-SIDED LOW BACK PAIN WITHOUT SCIATICA: ICD-10-CM

## 2025-07-15 DIAGNOSIS — M48.061 SPINAL STENOSIS, LUMBAR REGION, WITHOUT NEUROGENIC CLAUDICATION: ICD-10-CM

## 2025-07-15 DIAGNOSIS — G89.29 CHRONIC LEFT-SIDED LOW BACK PAIN WITHOUT SCIATICA: ICD-10-CM

## 2025-07-15 PROCEDURE — 97110 THERAPEUTIC EXERCISES: CPT | Mod: GP

## 2025-07-15 NOTE — PROGRESS NOTES
Physical Therapy Treatment    Patient Name: Jody Joyce  MRN: [id number]  Today's Date: 7/15/2025  Visit: 21  Referred by: Dr. Bennett  Time Calculation  Start Time: 0900  Stop Time: 0945  Time Calculation (min): 45 min    Diagnosis:   1. Chronic left-sided low back pain without sciatica  Follow Up In Physical Therapy      2. Left hip pain  Follow Up In Physical Therapy      3. Spinal stenosis, lumbar region, without neurogenic claudication  Follow Up In Physical Therapy          PRECAUTIONS:   Has had 2 labral repairs of the hip at [facility] '21 & '22.   MRI 12-5-24: L3-4 disc bulge, mild canal stenosis, facet arthropathy, mild lateral recess and foraminal stenosis.  L4-5 disc bulge, severe R) mod. L) facet hypertrophy, mod canal stenosis severe bilat lateral recess narrowing and L5 NR impingement,   L5-S1 disc bulge severe bilat facet hypertrophy, severe canal stenosis, severe bilat. Lateral recess narrowing, S1 NR impingement, mod to severe bilat neuroforaminal narrowing.       SUBJECTIVE:  Pt with soreness in her hips. 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 Has done a lot of physical activity.     Pain: 4-5/10  HEP: increased activity level     OBJECTIVE:  20 min dee of CKC activity     TREATMENT:  Nustep level 3  5  min.   Stair stretches - hip flex, hamstring, gastroc 3 x 20 sec mayuri     Air ex x 20 mayuri:   Weight shift AP, lateral   CR  Step taps 10 inch  Mini movement 3 way hip mayuri   Hip hike   Marches     SLS and sh romberg activity 5 min. Air ex   Dynamic stepping activity AP, lateral over 1/2 roller     Supine x 20 with coordinated breathing:  PT   Abduction red TB supine Clamshell   GS with bridge   SLR  AA abduction s/l  SAQ 1.5#   Dead bug  (knee flexion with diagonal UE to knee)  LAQ 2#     Gait activity NV  Lateral 10 ft x 2  Retro 10 ft x 2   Anterior with step length 10 ft x 2    ASSESSMENT:  Pt dee well with fatigue as CKC activity ended. Able to dee more dynamic balance for hip and core  strengthening. Pt is progressing well with improved endurance. Pt with good progression of dynamic balance activity with reduced UE assist. Fatigued end of session. CP x 10 min prior to leaving.       PLAN:   Progress dynamic gait activity NV as she was fatigued today.     Billing:     PT Therapeutic Procedures Time Entry  Therapeutic Exercise Time Entry: 45

## 2025-07-21 NOTE — PROGRESS NOTES
Follow Up Note    Today's Date:   7/22/2025     This is a very pleasant 56 yo female who presents for chronic left hip and non-radicular lower back pain. Despite conservative and non-surgical measures, her symptoms are significantly affecting her daily life and ability to ambulate.   -lumbar spondylosis causing severe central canal stenosis and impingement of peripheral spinal nerves in the lower lumbar spine, L4-5 as well as L5-S1  -lumbar facet arthropathy  -SI joint inflammation, more severe on the left   -bilateral greater trochanteric bursitis, more severe on the left  -IT band syndrome  -CKD:  Working her PCP     -March 27, 2025 bilateral SI joint injections-Dr. Jeffrey  -December 26, 2024 lumbar interlaminar epidural-Dr. Jeffrey  - July 1, 2025: Caudal epidural steroid injection-over 80% relief for approximately 2 weeks and continues to have some degree of relief at this time    - July 22, 2025 update: Had some relief with her most recent caudal epidural injection but again short-lived.  Had a discussion with the patient and her  that she has had multiple injections with no long-term benefit.  They were interested in increasing her gabapentin slowly, as needed and as tolerated.  I did advise caution given her CKD.  New Rx was provided for 300 mg capsules up to 3 times daily.  Discussed potential medial branch blocks but sounds like she may have had these in the past at the [facility] without proceeding to RFA.  Highly recommended she continue medical management such as increasing her gabapentin to continuing her physical therapy as well as [facility].  Understands to seek further surgical care if she has any worsening neurological deficits or pain.  They will follow-up with me in approximately 8 weeks.     PLAN:  1)  Imaging/Diagnostic Studies:   -Reviewed results of renal function panel from 6/2/25. Cr is elevated at 1.4 and eGFR is decreased at 44.   -Reviewed and interpreted images  from lumbar MRI in December 2024. It appears that the most significant degenerative changes are noted in L4-5 and L5-S1 with significant central canal stenosis and impingement of exiting spinal nerves at these associated levels.  2)  Therapy/Rehabilitation: Continue PT sessions. Speak with your PT regarding techniques for IT band stretching and    3)  Pharmacological Management: Increasing gabapentin as tolerated.  Starting at low dose due to history of CKD stage III. Continue to monitor renal function.  4)  Spine/Surgical Interventions: Some relief with caudal epidural steroid injection   5)  Alternative Treatments: May consider alternative treatment options in the future including manipulation (chiropractor versus osteopathic) and/or acupuncture if patient does not obtain optimal relief with initial treatment plan.  6)  Consultations: Referral made to Dr. Bennett for consideration of potential spinal decompression surgery  7)  Follow -up: 6-8 weeks or PRN if symptoms worsen/do not improve.   8)  Future treatment considerations:  -Consider bilateral MBB with possible RFA of L4-L5 and L5-S1 history of multiple injections with mostly short-lived relief.  Had a long discussion with her and her  that I feel there is not likely 1 injection to provide long-lasting significant benefit        Patient advised of the difference between hurt and harm and advised to continue with all normal activities and exercises. Patient verbalized understanding of the above plan and was happy with the care provided.       The above clinical summary has been dictated with voice recognition software. It has not been proofread for grammatical errors, typographical mistakes, or other semantic inconsistencies.     Thank you for visiting our office today. It was our pleasure to take part in your healthcare.      Do not hesitate to call with any questions regarding your plan of care after leaving at [phone]     To clinicians, thank  "you very much for this kind referral. It is a privilege to partner with you in the care of your patients. My office would be delighted to assist you with any further consultations or with questions regarding the plan of care outlined. Do not hesitate to call the office or contact me directly.      Sincerely,     RAFI Molina MD  , Physical Medicine and Rehabilitation, Orthopedic Spine  [facility] [employer] [employer]  [facility] [employer]        Jody Joyce \"Edilia"  is a 55 y.o. female who was last seen [July 1, 2025 ] for a caudal epidural steroid injection.  Since the last visit the pain is starting to return to pre-injection levels.  She had over 80% relief following her injection which lasted for about 2 weeks.  Pain is now at the left worse than right, pain can be a 8/10, still somewhat better than prior.  Pain is a 6/10 currently.    -PT: Continues and does her HEP  - GLO:  Good relief but mostly short lived.      TActive conservative therapy  in the last six months (see below)              1. Physical therapy:   Yes                                                                                  2. Home exercise program after PT: Yes                                                   3. A physician supervised home exercise program (HEP):  No               4. Chiropractic Care: Yes - provided some relief                                                                     Passive conservative therapy  in the last six months (see below)              1. NSAIDS: ibuprofen                                                                                                          2. Prescription pain medication: vicodin - no longer taking                                                            3. Acupuncture: yes                                                                                          4. Tens unit: Yes - inconsistent relief     Assistive " Devices: single point cane and electric scooter for long distances     Work status: ESL  and       ROS: Other than listed in HPI, PMHX below, and intake paperwork including a 30 point patient-recorded review of symptoms which was personally reviewed and inclusive of no history of unintentional weight loss, change in appetite, significant malaise, fevers, chills, or change in bowel/bladder, shortness of breath, or chest pain.     I have confirmed and edited as necessary Past Medical, Past Surgical, Family, Social History and ROS as obtained by others. These were also obtained on new patient forms.       PHYSICAL EXAM:   GENERAL APPEARANCE:  Well nourished, well developed, and no apparent distress.  NEURO PSYCH: Patient oriented to person, place, Mood pleasant. Benign affect.  MUSCULOSKELETAL and NEUROLOGICAL       VISUAL INSPECTION           LUMBAR: WNL-no sign of erythema near the site of prior injection.      PALPATION:           SPINOUS PROCESS: No significant midline tenderness           PARASPINALS: somewhat painful to palpate along the lower aspect of the lumbar paraspinals and gluteal muscles-left worse than right  FACET LOADING: Mildly positive on the left. Negative 30 second lumbar extension test.   MOTOR: Functionally full in bilateral lower extremities  GAIT: Antalgic gait. Able to go up and heels and toes with no sig. weakness.  No sig. balance deficit appreciated.-Similar to prior     DATA REVIEW:   The below imaging studies were personally reviewed and discussed with the patient.  -Lumbar MRI wo contrast from December 2024     Medical Decision Making:  The above note constitutes a Moderate to High level of medical decision making based on past data and imaging review, new and chronic symptoms with exacerbation, change in weakness or sensation, new imaging and diagnostic studies ordered, discussion of potential interventional or surgical treatment options, acute or chronic  "pain that may pose a threat to bodily function.    Medications Ordered Prior to Encounter[1]     Medical History[2]     Surgical History[3]     RX Allergies[4]           [1]   Current Outpatient Medications on File Prior to Visit   Medication Sig Dispense Refill    acetaminophen (Tylenol) 325 mg tablet Take 2 tablets (650 mg) by mouth every 6 hours if needed for mild pain (1 - 3).      atorvastatin (Lipitor) 40 mg tablet Take 1 tablet (40 mg) by mouth once daily at bedtime.      BD Ultra-Fine Mini Pen Needle 31 gauge x 3/16\" needle       blood sugar diagnostic strip CHECK BLOOD SUGARS 4 TIMES DAILY,  ICD-10: E11.9      dapagliflozin propanediol (Farxiga) 10 mg Take 1 tablet (10 mg) by mouth once daily in the morning.      estradiol (Estrace) 0.01 % (0.1 mg/gram) vaginal cream 1 g in applicator nightly.  Disp # 3,  42.5 gm tubes (90Days)  Refill # 42.5 g 3    famotidine (Pepcid) 40 mg tablet Take 1 tablet (40 mg) by mouth 2 times a day. 180 tablet 1    FreeStyle Jose 3 Sensor device USE AS DIRECTED TO MONITOR BLOOD GLUCOSE      gabapentin (Neurontin) 100 mg capsule Take 1 capsule (100 mg) by mouth 3 times a day. 90 capsule 1    Lantus Solostar U-100 Insulin 100 unit/mL (3 mL) pen Inject 15 units daily in the morning      levETIRAcetam (Keppra) 1,000 mg tablet       losartan (Cozaar) 50 mg tablet Take 0.5 tablets (25 mg) by mouth once daily.      metoprolol succinate XL (Toprol-XL) 100 mg 24 hr tablet       NovoLOG Flexpen U-100 Insulin 100 unit/mL (3 mL) pen INJECT 4 UNITS SUBCUTANEOUSLY 3 TIMES A DAY WITH MEALS PLUS SLIDING SCALE #1. TOTAL DAILY DOSE 30 UNITS      ondansetron ODT (Zofran-ODT) 4 mg disintegrating tablet Dissolve 1 tablet (4 mg) in the mouth 3 times a day as needed. (Patient not taking: Reported on 3/27/2025)      oxybutynin XL (Ditropan-XL) 10 mg 24 hr tablet Oxybutynin Chloride ER 10 MG Oral Tablet Extended Release 24 Hour   Quantity: 90  Refills: 0        Start : 27-Sep-2021   Active      " rizatriptan MLT (Maxalt-MLT) 10 mg disintegrating tablet Dissolve 1 tablet (10 mg) in the mouth. (Patient not taking: Reported on 3/27/2025)      semaglutide 0.25 mg or 0.5 mg (2 mg/3 mL) pen injector Inject 0.25 mg under the skin every 7 days. Inject 0.25 mg weekly for 4 weeks then increase to 0.5 mg. (Patient not taking: Reported on 6/10/2025) 3 mL 2    venlafaxine XR (Effexor-XR) 150 mg 24 hr capsule Take 1 capsule (150 mg) by mouth once daily.       No current facility-administered medications on file prior to visit.   [2]   Past Medical History:  Diagnosis Date    Ankle sprain about 30 years ago or more    Arthritis don't remember- 20 years ago or more    Chronic kidney disease about 3 1/2 years ago    Chronic pain disorder 1/1/2000    COVID-19     Delayed emergence from general anesthesia     Diverticulosis of large intestine without perforation or abscess without bleeding     Diverticulosis of large intestine without hemorrhage    Encounter for gynecological examination (general) (routine) without abnormal findings 08/29/2022    Encounter for well woman exam with routine gynecological exam    Endometriosis 1/1/1990    Fracture of ankle around 30 years ago or more    Fractures 4/1/1991    GERD (gastroesophageal reflux disease)     Hypertension don't remember    Joint pain 1/1/1995    Kidney disease     stage 3    Low back pain 1/1/1989    Low back strain multiple times- don't remember    Lumbosacral disc disease don't remember- about 25 years ago or more    Migraine 1/1/1990    Neck strain multiple times - don't remember    Personal history of other diseases of the female genital tract     History of dysfunctional uterine bleeding    Personal history of other diseases of the nervous system and sense organs     History of migraine    Personal history of other diseases of the respiratory system 04/29/2016    History of acute sinusitis    Personal history of other medical treatment 08/29/2022    History of  screening mammography    Personal history of urinary calculi     History of renal calculi    Sleep apnea     Spinal stenosis don't remember - about 25 years ago or more    Trochanteric bursitis, right hip     Greater trochanteric bursitis of both hips    Type 2 diabetes mellitus     Wrist sprain several different times don't remember   [3]   Past Surgical History:  Procedure Laterality Date    BREAST BIOPSY      GANGLION CYST EXCISION Right     LAPAROSCOPY DIAGNOSTIC / BIOPSY / ASPIRATION / LYSIS  04/11/2016    Laparoscopy (Diagnostic)    TRIGGER POINT INJECTION  1/1/1996   [4]   Allergies  Allergen Reactions    Oxycodone-Acetaminophen Unknown     vomiting    Sutures Other     Catgut sutures rejected got infection    Amoxicillin-Pot Clavulanate Other     yeast infection

## 2025-07-22 ENCOUNTER — APPOINTMENT (OUTPATIENT)
Dept: ORTHOPEDIC SURGERY | Facility: CLINIC | Age: 55
End: 2025-07-22
Payer: COMMERCIAL

## 2025-07-22 DIAGNOSIS — G89.4 CHRONIC PAIN SYNDROME: ICD-10-CM

## 2025-07-22 DIAGNOSIS — M47.816 LUMBAR SPONDYLOSIS: Primary | ICD-10-CM

## 2025-07-22 DIAGNOSIS — M48.062 LUMBAR STENOSIS WITH NEUROGENIC CLAUDICATION: ICD-10-CM

## 2025-07-22 PROCEDURE — 99214 OFFICE O/P EST MOD 30 MIN: CPT | Performed by: PHYSICAL MEDICINE & REHABILITATION

## 2025-07-22 PROCEDURE — 1036F TOBACCO NON-USER: CPT | Performed by: PHYSICAL MEDICINE & REHABILITATION

## 2025-07-22 PROCEDURE — 3044F HG A1C LEVEL LT 7.0%: CPT | Performed by: PHYSICAL MEDICINE & REHABILITATION

## 2025-07-22 PROCEDURE — 4010F ACE/ARB THERAPY RXD/TAKEN: CPT | Performed by: PHYSICAL MEDICINE & REHABILITATION

## 2025-07-22 RX ORDER — GABAPENTIN 100 MG/1
300 CAPSULE ORAL 3 TIMES DAILY
Qty: 270 CAPSULE | Refills: 1 | Status: SHIPPED | OUTPATIENT
Start: 2025-07-22 | End: 2025-08-21

## 2025-07-22 SDOH — SOCIAL STABILITY: SOCIAL NETWORK: SOCIAL ACTIVITY:: 6

## 2025-07-25 ENCOUNTER — TREATMENT (OUTPATIENT)
Dept: PHYSICAL THERAPY | Facility: CLINIC | Age: 55
End: 2025-07-25
Payer: COMMERCIAL

## 2025-07-25 DIAGNOSIS — G89.29 CHRONIC LEFT-SIDED LOW BACK PAIN WITHOUT SCIATICA: Primary | ICD-10-CM

## 2025-07-25 DIAGNOSIS — M48.061 SPINAL STENOSIS, LUMBAR REGION, WITHOUT NEUROGENIC CLAUDICATION: ICD-10-CM

## 2025-07-25 DIAGNOSIS — M54.50 CHRONIC LEFT-SIDED LOW BACK PAIN WITHOUT SCIATICA: Primary | ICD-10-CM

## 2025-07-25 DIAGNOSIS — M25.552 LEFT HIP PAIN: ICD-10-CM

## 2025-07-25 PROCEDURE — 97110 THERAPEUTIC EXERCISES: CPT | Mod: GP

## 2025-07-25 NOTE — PROGRESS NOTES
Physical Therapy Treatment    Patient Name: Jody Joyce  MRN: [id number]  Today's Date: 7/25/2025  Visit: 22  Referred by: Dr. Bennett  Time Calculation  Start Time: 0945  Stop Time: 1030  Time Calculation (min): 45 min    Diagnosis:   1. Chronic left-sided low back pain without sciatica        2. Left hip pain        3. Spinal stenosis, lumbar region, without neurogenic claudication              PRECAUTIONS:   Has had 2 labral repairs of the hip at [facility] '21 & '22.   MRI 12-5-24: L3-4 disc bulge, mild canal stenosis, facet arthropathy, mild lateral recess and foraminal stenosis.  L4-5 disc bulge, severe R) mod. L) facet hypertrophy, mod canal stenosis severe bilat lateral recess narrowing and L5 NR impingement,   L5-S1 disc bulge severe bilat facet hypertrophy, severe canal stenosis, severe bilat. Lateral recess narrowing, S1 NR impingement, mod to severe bilat neuroforaminal narrowing.       SUBJECTIVE:  Had follow up with Dr. Mason, has a plan for an HEP and to work with him on non surgical options. Gets tired quickly.     Pain: 4-5/10  HEP: increased activity level     OBJECTIVE:  Full session in [facility] WB   TREATMENT:  Nustep level 3  5  min.   Stair stretches - hip flex, hamstring, gastroc 3 x 20 sec mayuri     Air ex x 20 mayuri:   Weight shift AP, lateral   CR  Step taps 10 inch  Mini movement 3 way hip mayuri   Hip hike   Marches     SLS and sh romberg activity 5 min. Air ex   Dynamic stepping activity AP, lateral over 1/2 roller     LE reaching matrix planes     Lateral 10 ft x 2  Retro 10 ft x 2   Anterior with step length 10 ft x 2    ASSESSMENT:  Pt dee well with reports of fatigue on the LLE. Pt with discomfort end of session yet able to dee. Demonstrates improved endurance. Pt is able to complete balance activity with 1 UE assist. Pt performs well with distraction of conversation. Able to do more reps and spend more time with good participation. Dee well.     PLAN:   Progress gait activity      Billing:     PT Therapeutic Procedures Time Entry  Therapeutic Exercise Time Entry: 45

## 2025-07-31 ENCOUNTER — TREATMENT (OUTPATIENT)
Dept: PHYSICAL THERAPY | Facility: CLINIC | Age: 55
End: 2025-07-31
Payer: COMMERCIAL

## 2025-07-31 DIAGNOSIS — M54.50 CHRONIC LEFT-SIDED LOW BACK PAIN WITHOUT SCIATICA: Primary | ICD-10-CM

## 2025-07-31 DIAGNOSIS — M25.552 LEFT HIP PAIN: ICD-10-CM

## 2025-07-31 DIAGNOSIS — M48.061 SPINAL STENOSIS, LUMBAR REGION, WITHOUT NEUROGENIC CLAUDICATION: ICD-10-CM

## 2025-07-31 DIAGNOSIS — G89.29 CHRONIC LEFT-SIDED LOW BACK PAIN WITHOUT SCIATICA: Primary | ICD-10-CM

## 2025-07-31 PROCEDURE — 97110 THERAPEUTIC EXERCISES: CPT | Mod: GP

## 2025-07-31 NOTE — PROGRESS NOTES
Physical Therapy Treatment    Patient Name: Jody Joyce  MRN: [id number]  Today's Date: 7/31/2025  Visit: 23  Referred by: Dr. Bennett  Time Calculation  Start Time: 0945  Stop Time: 1033  Time Calculation (min): 48 min    Diagnosis:   1. Chronic left-sided low back pain without sciatica        2. Left hip pain        3. Spinal stenosis, lumbar region, without neurogenic claudication              PRECAUTIONS:   Has had 2 labral repairs of the hip at [facility] '21 & '22.   MRI 12-5-24: L3-4 disc bulge, mild canal stenosis, facet arthropathy, mild lateral recess and foraminal stenosis.  L4-5 disc bulge, severe R) mod. L) facet hypertrophy, mod canal stenosis severe bilat lateral recess narrowing and L5 NR impingement,   L5-S1 disc bulge severe bilat facet hypertrophy, severe canal stenosis, severe bilat. Lateral recess narrowing, S1 NR impingement, mod to severe bilat neuroforaminal narrowing.       SUBJECTIVE:  Pt does not feel well today. Pt has pain on her left side. Feels between the rainy weather and more activity is just really tired.   Pain: 4-5/10  HEP: increased activity level     OBJECTIVE:  Full session in [facility] WB   TREATMENT:  Nustep level 3  5  min. Held   Stair stretches - hip flex, hamstring, gastroc 3 x 20 sec mayuri     Air ex x 20 mayuri:   Weight shift AP, lateral   CR  Step taps 10 inch  Mini movement 3 way hip mayuri   Hip hike   Marches     SLS and sh romberg activity 5 min. Air ex   Dynamic stepping activity AP, lateral over 1/2 roller     LE reaching matrix planes     Lateral 10 ft x 2  Retro 10 ft x 2   Anterior with step length 10 ft x 2    ASSESSMENT:  Modified activity to her tolerance today. Pt limited by fatigue. Pt has left leg pain with movement/WB. Rested as able. Pt challenged herself and pushed thru a lot of discomfort. Pt rested prior to leaving. Pt is dee a significant more overall functional activity in her home. Returns to work in 2 weeks. Pt to continue at this time with an HEP.    Goals achieved.     Access Code: [email or web address]  URL: [email or web address]  Date: 07/31/2025  Prepared by: Onelia Aragon    Exercises  - Sit to Stand  - 1 x daily - 5 x weekly - 4 sets - 5 reps  - Step Up  - 1 x daily - 5 x weekly - 2 sets - 10 reps  - Sidestepping  - 1 x daily - 5 x weekly - 2 sets - 10 reps  - Single Leg Balance with Clock Reach  - 1 x daily - 5 x weekly - 2 sets - 10 reps  - Walking March  - 1 x daily - 5 x weekly - 2 sets - 10 reps  - Standing Hip Extension with Resistance at Ankles and Counter Support  - 1 x daily - 5 x weekly - 2 sets - 10 reps  - Standing Hip Abduction with Resistance at Ankles and Counter Support  - 1 x daily - 5 x weekly - 2 sets - 10 reps  - Single Leg Stance  - 1 x daily - 5 x weekly - 2 sets - 10 reps      PLAN:   Discharge to [facility].     Billing:     PT Therapeutic Procedures Time Entry  Therapeutic Exercise Time Entry: 48

## 2025-08-11 ENCOUNTER — APPOINTMENT (OUTPATIENT)
Dept: OBSTETRICS AND GYNECOLOGY | Facility: CLINIC | Age: 55
End: 2025-08-11
Payer: COMMERCIAL

## 2025-08-11 VITALS — WEIGHT: 180 LBS | HEIGHT: 61 IN | BODY MASS INDEX: 33.99 KG/M2

## 2025-08-11 DIAGNOSIS — N95.2 VAGINAL ATROPHY: ICD-10-CM

## 2025-08-11 DIAGNOSIS — R10.32 LLQ PAIN: Primary | ICD-10-CM

## 2025-08-11 DIAGNOSIS — N64.4 BREAST PAIN, RIGHT: ICD-10-CM

## 2025-08-11 PROCEDURE — 3044F HG A1C LEVEL LT 7.0%: CPT | Performed by: OBSTETRICS & GYNECOLOGY

## 2025-08-11 PROCEDURE — 3008F BODY MASS INDEX DOCD: CPT | Performed by: OBSTETRICS & GYNECOLOGY

## 2025-08-11 PROCEDURE — 99396 PREV VISIT EST AGE 40-64: CPT | Performed by: OBSTETRICS & GYNECOLOGY

## 2025-08-11 PROCEDURE — 1036F TOBACCO NON-USER: CPT | Performed by: OBSTETRICS & GYNECOLOGY

## 2025-08-11 PROCEDURE — 4010F ACE/ARB THERAPY RXD/TAKEN: CPT | Performed by: OBSTETRICS & GYNECOLOGY

## 2025-08-11 RX ORDER — PRIMIDONE 50 MG/1
50 TABLET ORAL NIGHTLY
COMMUNITY
Start: 2025-08-08

## 2025-08-11 RX ORDER — ESTRADIOL 0.1 MG/G
CREAM VAGINAL
Qty: 42.5 G | Refills: 3 | Status: SHIPPED | OUTPATIENT
Start: 2025-08-11

## 2025-08-11 ASSESSMENT — PATIENT HEALTH QUESTIONNAIRE - PHQ9
1. LITTLE INTEREST OR PLEASURE IN DOING THINGS: NOT AT ALL
2. FEELING DOWN, DEPRESSED OR HOPELESS: NOT AT ALL
SUM OF ALL RESPONSES TO PHQ9 QUESTIONS 1 AND 2: 0

## 2025-08-15 ENCOUNTER — APPOINTMENT (OUTPATIENT)
Dept: ENDOCRINOLOGY | Facility: CLINIC | Age: 55
End: 2025-08-15
Payer: COMMERCIAL

## 2025-08-15 VITALS — BODY MASS INDEX: 34.58 KG/M2 | DIASTOLIC BLOOD PRESSURE: 70 MMHG | SYSTOLIC BLOOD PRESSURE: 116 MMHG | WEIGHT: 183 LBS

## 2025-08-15 DIAGNOSIS — E78.00 HYPERCHOLESTEREMIA: ICD-10-CM

## 2025-08-15 DIAGNOSIS — E11.29 MICROALBUMINURIA DUE TO TYPE 2 DIABETES MELLITUS (MULTI): ICD-10-CM

## 2025-08-15 DIAGNOSIS — E11.8 CONTROLLED DIABETES MELLITUS TYPE 2 WITH COMPLICATIONS, UNSPECIFIED WHETHER LONG TERM INSULIN USE (MULTI): Primary | ICD-10-CM

## 2025-08-15 DIAGNOSIS — R80.9 MICROALBUMINURIA DUE TO TYPE 2 DIABETES MELLITUS (MULTI): ICD-10-CM

## 2025-08-15 DIAGNOSIS — I10 BENIGN ESSENTIAL HYPERTENSION: ICD-10-CM

## 2025-08-15 LAB — POC HEMOGLOBIN A1C: 6.3 % (ref 4.2–7)

## 2025-08-15 PROCEDURE — 99214 OFFICE O/P EST MOD 30 MIN: CPT | Performed by: INTERNAL MEDICINE

## 2025-08-15 PROCEDURE — 3078F DIAST BP <80 MM HG: CPT | Performed by: INTERNAL MEDICINE

## 2025-08-15 PROCEDURE — 3044F HG A1C LEVEL LT 7.0%: CPT | Performed by: INTERNAL MEDICINE

## 2025-08-15 PROCEDURE — 4010F ACE/ARB THERAPY RXD/TAKEN: CPT | Performed by: INTERNAL MEDICINE

## 2025-08-15 PROCEDURE — 83036 HEMOGLOBIN GLYCOSYLATED A1C: CPT | Performed by: INTERNAL MEDICINE

## 2025-08-15 PROCEDURE — 3074F SYST BP LT 130 MM HG: CPT | Performed by: INTERNAL MEDICINE

## 2025-08-15 RX ORDER — METFORMIN HYDROCHLORIDE 500 MG/1
500 TABLET, EXTENDED RELEASE ORAL
Qty: 180 TABLET | Refills: 3 | Status: SHIPPED | OUTPATIENT
Start: 2025-08-15 | End: 2026-08-15

## 2025-08-16 ENCOUNTER — HOSPITAL ENCOUNTER (OUTPATIENT)
Dept: RADIOLOGY | Facility: CLINIC | Age: 55
Discharge: HOME | End: 2025-08-16
Payer: COMMERCIAL

## 2025-08-16 DIAGNOSIS — R10.32 LLQ PAIN: ICD-10-CM

## 2025-08-16 PROCEDURE — 76857 US EXAM PELVIC LIMITED: CPT | Performed by: RADIOLOGY

## 2025-08-16 PROCEDURE — 76856 US EXAM PELVIC COMPLETE: CPT

## 2025-08-16 PROCEDURE — 76830 TRANSVAGINAL US NON-OB: CPT | Performed by: RADIOLOGY

## 2025-08-26 ENCOUNTER — APPOINTMENT (OUTPATIENT)
Dept: NEPHROLOGY | Facility: CLINIC | Age: 55
End: 2025-08-26
Payer: COMMERCIAL

## 2025-09-16 ENCOUNTER — APPOINTMENT (OUTPATIENT)
Dept: ORTHOPEDIC SURGERY | Facility: CLINIC | Age: 55
End: 2025-09-16
Payer: COMMERCIAL

## 2025-10-10 ENCOUNTER — APPOINTMENT (OUTPATIENT)
Dept: INTEGRATIVE MEDICINE | Facility: CLINIC | Age: 55
End: 2025-10-10
Payer: COMMERCIAL

## 2025-10-27 ENCOUNTER — APPOINTMENT (OUTPATIENT)
Dept: OBSTETRICS AND GYNECOLOGY | Facility: CLINIC | Age: 55
End: 2025-10-27
Payer: COMMERCIAL

## 2025-12-26 ENCOUNTER — APPOINTMENT (OUTPATIENT)
Dept: ENDOCRINOLOGY | Facility: CLINIC | Age: 55
End: 2025-12-26
Payer: COMMERCIAL